# Patient Record
Sex: MALE | Race: WHITE | NOT HISPANIC OR LATINO | Employment: FULL TIME | URBAN - METROPOLITAN AREA
[De-identification: names, ages, dates, MRNs, and addresses within clinical notes are randomized per-mention and may not be internally consistent; named-entity substitution may affect disease eponyms.]

---

## 2023-08-28 ENCOUNTER — TELEPHONE (OUTPATIENT)
Dept: GASTROENTEROLOGY | Facility: CLINIC | Age: 63
End: 2023-08-28
Payer: COMMERCIAL

## 2023-08-28 NOTE — TELEPHONE ENCOUNTER
CALLED THE PT AND TOLD HIM TO CALL HIS PCP AND HAVE THEM TO SEND THE REFERRAL HERE [facility] [place]

## 2023-08-28 NOTE — TELEPHONE ENCOUNTER
THE PT'S WIFE CALLED AND THE PT'S PCP SENT A REFERRAL TO [facility] BUT THEY WANT TO BE SEEN HERE.  WILL YOU CALL THIS PT BACK?  [phone]

## 2023-09-28 ENCOUNTER — TELEPHONE (OUTPATIENT)
Dept: GASTROENTEROLOGY | Facility: CLINIC | Age: 63
End: 2023-09-28
Payer: COMMERCIAL

## 2023-09-28 NOTE — TELEPHONE ENCOUNTER
PT HAS NO PERSONAL OR FAMILY HX OF POLYPS-----NO FAMILY HX OF COLON CA----NO ASA OR BLOOD THINNERS-----LIST OF MEDICATIONS        OLMESARTAN MEDOXOMIL                          OA QUESTIONNAIRE SCANNED IN MEDIA

## 2023-10-02 ENCOUNTER — PREP FOR SURGERY (OUTPATIENT)
Dept: OTHER | Facility: HOSPITAL | Age: 63
End: 2023-10-02
Payer: COMMERCIAL

## 2023-10-02 DIAGNOSIS — Z12.11 ENCOUNTER FOR SCREENING FOR MALIGNANT NEOPLASM OF COLON: Primary | ICD-10-CM

## 2023-11-16 ENCOUNTER — TELEPHONE (OUTPATIENT)
Dept: CARDIOLOGY | Facility: CLINIC | Age: 63
End: 2023-11-16

## 2023-11-16 NOTE — TELEPHONE ENCOUNTER
"  Caller: HOANG GUO    Relationship: Emergency Contact    Best call back number: [phone]    What is the best time to reach you: ANYTIME     Who are you requesting to speak with (clinical staff, provider,  specific staff member): SCHEDULING     What was the call regarding: PT'S WIFE CALLED IN STATING SHE HAD RECEIVED A CALL, AND IT WAS IN REGARDS TO THE PT'S \"NEW PATIENT\" APPT ON 12.4.23, SHE SAYS WE TOLD HER THERE WAS SOME OPEN LUCIANA THIS WEEK AND NEXT TO GET THE PT IN SOONER. PLEASE ADVISE AND TRY PT BACK TO RESCHEDULE.     Is it okay if the provider responds through MyChart: CALL BACK   "

## 2023-11-22 ENCOUNTER — OFFICE VISIT (OUTPATIENT)
Age: 63
End: 2023-11-22
Payer: COMMERCIAL

## 2023-11-22 VITALS
WEIGHT: 307.4 LBS | DIASTOLIC BLOOD PRESSURE: 86 MMHG | HEIGHT: 74 IN | BODY MASS INDEX: 39.45 KG/M2 | HEART RATE: 114 BPM | SYSTOLIC BLOOD PRESSURE: 138 MMHG

## 2023-11-22 DIAGNOSIS — I10 HYPERTENSION, UNSPECIFIED TYPE: ICD-10-CM

## 2023-11-22 DIAGNOSIS — I48.0 PAROXYSMAL ATRIAL FIBRILLATION: Primary | ICD-10-CM

## 2023-11-22 PROCEDURE — 93000 ELECTROCARDIOGRAM COMPLETE: CPT | Performed by: INTERNAL MEDICINE

## 2023-11-22 PROCEDURE — 99204 OFFICE O/P NEW MOD 45 MIN: CPT | Performed by: INTERNAL MEDICINE

## 2023-11-22 RX ORDER — TRIAMCINOLONE ACETONIDE 1 MG/G
CREAM TOPICAL
COMMUNITY
Start: 2023-10-26

## 2023-11-22 RX ORDER — OLMESARTAN MEDOXOMIL 20 MG/1
1 TABLET ORAL DAILY
COMMUNITY
Start: 2023-11-14

## 2023-11-22 RX ORDER — METOPROLOL SUCCINATE 25 MG/1
25 TABLET, EXTENDED RELEASE ORAL DAILY
Qty: 30 TABLET | Refills: 11 | Status: SHIPPED | OUTPATIENT
Start: 2023-11-22

## 2023-11-22 NOTE — PROGRESS NOTES
Nghia SYED Jimenez  1960  Date of Office Visit: 11/22/23  Encounter Provider: Nuno Mruo MD  Place of Service: [facility] CARDIOLOGY      CHIEF COMPLAINT:  Dyspnea on exertion  Irregular heartbeat    HISTORY OF PRESENT ILLNESS:  Very pleasant 63-year-old male with a medical history of obesity, essential hypertension, who presents to me secondary to worsening dyspnea on exertion, lower extremity edema, and palpitations.  He states that his blood pressure cuff has been reading his heart rhythm is irregular.  He chronically has had lower extremity edema which is slightly worse as of late.  He has never been diagnosed with sleep apnea but his wife states that he snores.  He denies any chest pain.  He presents today and is in atrial fibrillation with a rapid ventricular rate of about 120 bpm.  His blood pressure is mildly elevated at 142/86.    Review of Systems   Constitutional: Negative for fever and malaise/fatigue.   HENT:  Negative for nosebleeds and sore throat.    Eyes:  Negative for blurred vision and double vision.   Cardiovascular:  Negative for chest pain, claudication, palpitations and syncope.   Respiratory:  Negative for cough, shortness of breath and snoring.    Endocrine: Negative for cold intolerance, heat intolerance and polydipsia.   Skin:  Negative for itching, poor wound healing and rash.   Musculoskeletal:  Negative for joint pain, joint swelling, muscle weakness and myalgias.   Gastrointestinal:  Negative for abdominal pain, melena, nausea and vomiting.   Neurological:  Negative for light-headedness, loss of balance, seizures, vertigo and weakness.   Psychiatric/Behavioral:  Negative for altered mental status and depression.        Past Medical History:   Diagnosis Date    Hypertension        The following portions of the patient's history were reviewed and updated as appropriate: Social history , Family history, and Surgical history     Current Outpatient  "Medications on File Prior to Visit   Medication Sig Dispense Refill    olmesartan (BENICAR) 20 MG tablet Take 1 tablet by mouth Daily.      STAHIST AD 25-60 MG tablet Take 1 tablet by mouth Every 8 (Eight) Hours.      triamcinolone (KENALOG) 0.1 % cream APPLY TOPICALLY TO THE AFFECTED AREA TWICE DAILY FOR UP TO 2 WEEKS THEN AS NEEDED FOR FLARES      sulfamethoxazole-trimethoprim (BACTRIM DS,SEPTRA DS) 800-160 MG per tablet Take 1 tablet by mouth Every 12 (Twelve) Hours. (Patient not taking: Reported on 11/22/2023)       No current facility-administered medications on file prior to visit.       No Known Allergies    Vitals:    11/22/23 1412 11/22/23 1421   BP: 142/82 138/86   BP Location: Right arm Left arm   Patient Position: Sitting Sitting   Cuff Size: Large Adult Large Adult   Pulse: 114    Weight: (!) 139 kg (307 lb 6.4 oz)    Height: 188 cm (74\")      Body mass index is 39.47 kg/m².   Constitutional:       Appearance: Well-developed.   Eyes:      General: No scleral icterus.     Conjunctiva/sclera: Conjunctivae normal.   HENT:      Head: Normocephalic and atraumatic.   Neck:      Thyroid: No thyromegaly.      Vascular: Normal carotid pulses. No carotid bruit, hepatojugular reflux or JVD.      Trachea: No tracheal deviation.   Pulmonary:      Effort: No respiratory distress.      Breath sounds: Normal breath sounds. No decreased breath sounds. No wheezing. No rhonchi. No rales.   Chest:      Chest wall: Not tender to palpatation.   Cardiovascular:      Tachycardia present. Irregularly irregular rhythm.      No gallop.    Pulses:     Carotid: 2+ bilaterally.     Radial: 2+ bilaterally.     Femoral: 2+ bilaterally.     Dorsalis pedis: 2+ bilaterally.     Posterior tibial: 2+ bilaterally.  Edema:     Peripheral edema absent.   Abdominal:      General: Bowel sounds are normal. There is no distension.      Palpations: Abdomen is soft.      Tenderness: There is no abdominal tenderness.   Musculoskeletal:         " General: No deformity.      Cervical back: Normal range of motion and neck supple. Skin:     Findings: No erythema or rash.   Neurological:      Mental Status: Alert and oriented to person, place, and time.      Sensory: No sensory deficit.   Psychiatric:         Behavior: Behavior normal.             ECG 12 Lead    Date/Time: 11/22/2023 3:13 PM  Performed by: Nuno Muro MD    Authorized by: Nuno Muro MD  Comparison: not compared with previous ECG   Previous ECG: no previous ECG available  Rhythm: atrial fibrillation  Ectopy: unifocal PVCs  Rate: tachycardic  Other findings: non-specific ST-T wave changes               DISCUSSION/SUMMARY  63-year-old male with medical history of obesity, essential hypertension, prediabetes with a hemoglobin A1c of 6.2, who presents to me with worsening dyspnea on exertion over the past few months.  He denies any orthopnea or PND but does have lower extremity edema which he feels like has been a little bit worse.  He is in atrial fibrillation with a rapid ventricular rate which is a new diagnosis for him.    1.  Atrial fibrillation with RVR  - Recommend starting Eliquis therapy in the setting of his essential hypertension and prediabetes along with atrial fibrillation  -Low-dose Toprol started 25 mg p.o. daily  -He will have a transthoracic echocardiogram in about a week or 2.  Depending on his LV function and left atrial size we would consider cardioversion in 3 weeks.  -Sleep evaluation placed.    2.  Obesity: Dietary modification weight loss recommended.  3.  Essential hypertension: Should be reasonably well-controlled on the combination of olmesartan and Toprol therapy.

## 2023-11-28 ENCOUNTER — TELEPHONE (OUTPATIENT)
Age: 63
End: 2023-11-28
Payer: COMMERCIAL

## 2023-11-28 NOTE — TELEPHONE ENCOUNTER
When I called to get him scheduled. His wife wanted me to let you know the last few days he said he has needed to sleep in his recliner because he said that he breathes better that way.    He has a referral for sleep medicine and his echo is coming up on 12/5/23.    I told her I would make you aware.    Asia

## 2023-11-28 NOTE — TELEPHONE ENCOUNTER
----- Message from Nuno Muro MD sent at 11/22/2023  3:16 PM EST -----  Needs to see me in 2 to 3 months.

## 2023-11-29 NOTE — TELEPHONE ENCOUNTER
Pt wife left a VM stating the pt would like to be seen on 1/24 @03786.  I placed the pt on the schedule for that day and time.

## 2023-11-30 NOTE — TELEPHONE ENCOUNTER
I called the pt/wife back. Dr. Muro wanted him seen in 2 weeks due to orthopnea, so I got him on for for 12/20/23.    Is that soon enough or do wee need to add him on at either end of our clinic one day at 9:20a or 4p?    Please advise.    Thanks,  Asia

## 2023-12-05 ENCOUNTER — HOSPITAL ENCOUNTER (OUTPATIENT)
Dept: CARDIOLOGY | Facility: HOSPITAL | Age: 63
Discharge: HOME | End: 2023-12-05
Admitting: INTERNAL MEDICINE
Payer: COMMERCIAL

## 2023-12-05 VITALS
DIASTOLIC BLOOD PRESSURE: 80 MMHG | SYSTOLIC BLOOD PRESSURE: 128 MMHG | BODY MASS INDEX: 39.33 KG/M2 | HEIGHT: 74 IN | WEIGHT: 306.44 LBS | HEART RATE: 85 BPM

## 2023-12-05 DIAGNOSIS — I48.0 PAROXYSMAL ATRIAL FIBRILLATION: ICD-10-CM

## 2023-12-05 LAB
AORTIC ARCH: 3.1 CM
AORTIC DIMENSIONLESS INDEX: 0.5 (DI)
ASCENDING AORTA: 3.8 CM
BH CV ECHO MEAS - ACS: 1.89 CM
BH CV ECHO MEAS - AO MAX PG: 9.4 MMHG
BH CV ECHO MEAS - AO MEAN PG: 5.6 MMHG
BH CV ECHO MEAS - AO ROOT DIAM: 3.1 CM
BH CV ECHO MEAS - AO V2 MAX: 153.1 CM/SEC
BH CV ECHO MEAS - AO V2 VTI: 27.9 CM
BH CV ECHO MEAS - AVA(I,D): 2.14 CM2
BH CV ECHO MEAS - EDV(CUBED): 153.6 ML
BH CV ECHO MEAS - EDV(MOD-SP2): 210 ML
BH CV ECHO MEAS - EDV(MOD-SP4): 216 ML
BH CV ECHO MEAS - EF(MOD-BP): 40.9 %
BH CV ECHO MEAS - EF(MOD-SP2): 40 %
BH CV ECHO MEAS - EF(MOD-SP4): 42.1 %
BH CV ECHO MEAS - ESV(CUBED): 64.9 ML
BH CV ECHO MEAS - ESV(MOD-SP2): 126 ML
BH CV ECHO MEAS - ESV(MOD-SP4): 125 ML
BH CV ECHO MEAS - FS: 24.9 %
BH CV ECHO MEAS - IVS/LVPW: 0.87 CM
BH CV ECHO MEAS - IVSD: 0.99 CM
BH CV ECHO MEAS - LAT PEAK E' VEL: 13.2 CM/SEC
BH CV ECHO MEAS - LV DIASTOLIC VOL/BSA (35-75): 82.9 CM2
BH CV ECHO MEAS - LV MASS(C)D: 220.8 GRAMS
BH CV ECHO MEAS - LV MAX PG: 2.38 MMHG
BH CV ECHO MEAS - LV MEAN PG: 1.45 MMHG
BH CV ECHO MEAS - LV SYSTOLIC VOL/BSA (12-30): 48 CM2
BH CV ECHO MEAS - LV V1 MAX: 77.1 CM/SEC
BH CV ECHO MEAS - LV V1 VTI: 14.8 CM
BH CV ECHO MEAS - LVIDD: 5.4 CM
BH CV ECHO MEAS - LVIDS: 4 CM
BH CV ECHO MEAS - LVOT AREA: 4 CM2
BH CV ECHO MEAS - LVOT DIAM: 2.27 CM
BH CV ECHO MEAS - LVPWD: 1.14 CM
BH CV ECHO MEAS - MED PEAK E' VEL: 8.7 CM/SEC
BH CV ECHO MEAS - MV DEC SLOPE: 501.7 CM/SEC2
BH CV ECHO MEAS - MV DEC TIME: 0.16 SEC
BH CV ECHO MEAS - MV E MAX VEL: 101 CM/SEC
BH CV ECHO MEAS - MV MAX PG: 6.3 MMHG
BH CV ECHO MEAS - MV MEAN PG: 2.7 MMHG
BH CV ECHO MEAS - MV P1/2T: 72.6 MSEC
BH CV ECHO MEAS - MV V2 VTI: 25.6 CM
BH CV ECHO MEAS - MVA(P1/2T): 3 CM2
BH CV ECHO MEAS - MVA(VTI): 2.34 CM2
BH CV ECHO MEAS - PA ACC TIME: 0.1 SEC
BH CV ECHO MEAS - PA V2 MAX: 66.7 CM/SEC
BH CV ECHO MEAS - PI END-D VEL: 137.5 CM/SEC
BH CV ECHO MEAS - QP/QS: 0.9
BH CV ECHO MEAS - RAP SYSTOLE: 8 MMHG
BH CV ECHO MEAS - RV MAX PG: 0.95 MMHG
BH CV ECHO MEAS - RV V1 MAX: 48.8 CM/SEC
BH CV ECHO MEAS - RV V1 VTI: 11.2 CM
BH CV ECHO MEAS - RVOT DIAM: 2.47 CM
BH CV ECHO MEAS - RVSP: 43.8 MMHG
BH CV ECHO MEAS - SI(MOD-SP2): 32.2 ML/M2
BH CV ECHO MEAS - SI(MOD-SP4): 34.9 ML/M2
BH CV ECHO MEAS - SUP REN AO DIAM: 2.6 CM
BH CV ECHO MEAS - SV(LVOT): 59.8 ML
BH CV ECHO MEAS - SV(MOD-SP2): 84 ML
BH CV ECHO MEAS - SV(MOD-SP4): 91 ML
BH CV ECHO MEAS - SV(RVOT): 53.7 ML
BH CV ECHO MEAS - TAPSE (>1.6): 1.91 CM
BH CV ECHO MEAS - TR MAX PG: 35.8 MMHG
BH CV ECHO MEAS - TR MAX VEL: 299.2 CM/SEC
BH CV ECHO MEASUREMENTS AVERAGE E/E' RATIO: 9.22
BH CV XLRA - RV BASE: 4.1 CM
BH CV XLRA - RV LENGTH: 8.1 CM
BH CV XLRA - RV MID: 4.1 CM
BH CV XLRA - TDI S': 13.2 CM/SEC
LEFT ATRIUM VOLUME INDEX: 49.8 ML/M2
SINUS: 3.3 CM
STJ: 3.2 CM

## 2023-12-05 PROCEDURE — 93306 TTE W/DOPPLER COMPLETE: CPT | Performed by: INTERNAL MEDICINE

## 2023-12-05 PROCEDURE — 25510000001 PERFLUTREN (DEFINITY) 8.476 MG IN SODIUM CHLORIDE (PF) 0.9 % 10 ML INJECTION: Performed by: INTERNAL MEDICINE

## 2023-12-05 PROCEDURE — 93306 TTE W/DOPPLER COMPLETE: CPT

## 2023-12-05 RX ADMIN — PERFLUTREN 2 ML: 6.52 INJECTION, SUSPENSION INTRAVENOUS at 08:37

## 2023-12-07 ENCOUNTER — TELEPHONE (OUTPATIENT)
Dept: GASTROENTEROLOGY | Facility: CLINIC | Age: 63
End: 2023-12-07

## 2023-12-07 NOTE — TELEPHONE ENCOUNTER
Caller: Nghia Jimenez    Relationship to patient: Self    Best call back number: [phone]     Chief complaint: PATIENT HAS AN IRREGULAR HEARTBEAT AND IS WANTING TO TAKE CARE OF THIS ISSUE FIRST.     Type of visit: PROCEDURE     Requested date: PATIENT'S WIFE IS THINKING THAT THEY MAY BE ABLE TO RESCHEDULE FOR MARCH 2024.      If rescheduling, when is the original appointment: 12/13/23     Additional notes: PLEASE CALL BACK ASAP TO ADVISE WITH SCHEDULING.

## 2023-12-07 NOTE — TELEPHONE ENCOUNTER
Caller: Nghia Jimenez    Relationship to patient: Self    Best call back number: [phone]     Chief complaint: PATIENT HAS AN IRREGULAR HEARTBEAT AND IS WANTING TO TAKE CARE OF THIS ISSUE FIRST.     Type of visit: PROCEDURE     Requested date: PATIENT'S WIFE IS THINKING THAT THEY MAY BE ABLE TO RESCHEDULE FOR MARCH 2024.      If rescheduling, when is the original appointment: 12/13/23     Additional notes: PLEASE CALL BACK ASAP TO ADVISE WITH SCHEDULING.

## 2023-12-08 ENCOUNTER — TELEPHONE (OUTPATIENT)
Dept: CARDIOLOGY | Facility: CLINIC | Age: 63
End: 2023-12-08
Payer: COMMERCIAL

## 2023-12-08 NOTE — TELEPHONE ENCOUNTER
Pt called wanting the results of his 12/5/23 Echo. It is in [facility].    This is maddy we are bringing back in on 12/20/23 for orthopnea.     He can be reached at P#[phone].        Thanks,  Asia

## 2023-12-12 ENCOUNTER — TELEPHONE (OUTPATIENT)
Age: 63
End: 2023-12-12
Payer: COMMERCIAL

## 2023-12-12 NOTE — TELEPHONE ENCOUNTER
Called and spoke with his wife and gave her the results of his echo.    She is concerned because he has started to develop a cough and his BP is running high on his diastolic. His BP is running around 140/100s.    I moved up his appt to tomorrow with Dr. Muro.    Asia

## 2023-12-12 NOTE — TELEPHONE ENCOUNTER
----- Message from Nuno Muro MD sent at 12/11/2023  3:07 PM EST -----  Please let him know that the atrial fibrillation has made his heart muscle little bit weak.  We will plan on seeing him back at his follow-up on 12/20 and if he is still in atrial fibrillation we will get him set up for direct-current cardioversion

## 2023-12-13 ENCOUNTER — OUTSIDE FACILITY SERVICE (OUTPATIENT)
Dept: GASTROENTEROLOGY | Facility: CLINIC | Age: 63
End: 2023-12-13
Payer: COMMERCIAL

## 2023-12-13 ENCOUNTER — OFFICE VISIT (OUTPATIENT)
Age: 63
End: 2023-12-13
Payer: COMMERCIAL

## 2023-12-13 ENCOUNTER — LAB (OUTPATIENT)
Dept: LAB | Facility: HOSPITAL | Age: 63
End: 2023-12-13
Payer: COMMERCIAL

## 2023-12-13 VITALS
OXYGEN SATURATION: 97 % | HEIGHT: 74 IN | DIASTOLIC BLOOD PRESSURE: 100 MMHG | BODY MASS INDEX: 39.53 KG/M2 | SYSTOLIC BLOOD PRESSURE: 134 MMHG | HEART RATE: 107 BPM | WEIGHT: 308 LBS

## 2023-12-13 DIAGNOSIS — I10 HYPERTENSION, UNSPECIFIED TYPE: ICD-10-CM

## 2023-12-13 DIAGNOSIS — I48.0 PAROXYSMAL ATRIAL FIBRILLATION: ICD-10-CM

## 2023-12-13 DIAGNOSIS — I48.0 PAROXYSMAL ATRIAL FIBRILLATION: Primary | ICD-10-CM

## 2023-12-13 LAB
ALBUMIN SERPL-MCNC: 4.4 G/DL (ref 3.5–5.2)
ALBUMIN/GLOB SERPL: 1.9 G/DL
ALP SERPL-CCNC: 82 U/L (ref 39–117)
ALT SERPL W P-5'-P-CCNC: 24 U/L (ref 1–41)
ANION GAP SERPL CALCULATED.3IONS-SCNC: 9 MMOL/L (ref 5–15)
AST SERPL-CCNC: 22 U/L (ref 1–40)
BASOPHILS # BLD AUTO: 0.04 10*3/MM3 (ref 0–0.2)
BASOPHILS NFR BLD AUTO: 0.7 % (ref 0–1.5)
BILIRUB SERPL-MCNC: 0.9 MG/DL (ref 0–1.2)
BUN SERPL-MCNC: 15 MG/DL (ref 8–23)
BUN/CREAT SERPL: 12.9 (ref 7–25)
CALCIUM SPEC-SCNC: 9.3 MG/DL (ref 8.6–10.5)
CHLORIDE SERPL-SCNC: 105 MMOL/L (ref 98–107)
CO2 SERPL-SCNC: 28 MMOL/L (ref 22–29)
CREAT SERPL-MCNC: 1.16 MG/DL (ref 0.76–1.27)
DEPRECATED RDW RBC AUTO: 44.9 FL (ref 37–54)
EGFRCR SERPLBLD CKD-EPI 2021: 71.2 ML/MIN/1.73
EOSINOPHIL # BLD AUTO: 0.09 10*3/MM3 (ref 0–0.4)
EOSINOPHIL NFR BLD AUTO: 1.7 % (ref 0.3–6.2)
ERYTHROCYTE [DISTWIDTH] IN BLOOD BY AUTOMATED COUNT: 13.1 % (ref 12.3–15.4)
GLOBULIN UR ELPH-MCNC: 2.3 GM/DL
GLUCOSE SERPL-MCNC: 123 MG/DL (ref 65–99)
HCT VFR BLD AUTO: 47.8 % (ref 37.5–51)
HGB BLD-MCNC: 16 G/DL (ref 13–17.7)
IMM GRANULOCYTES # BLD AUTO: 0.01 10*3/MM3 (ref 0–0.05)
IMM GRANULOCYTES NFR BLD AUTO: 0.2 % (ref 0–0.5)
LYMPHOCYTES # BLD AUTO: 0.91 10*3/MM3 (ref 0.7–3.1)
LYMPHOCYTES NFR BLD AUTO: 16.9 % (ref 19.6–45.3)
MCH RBC QN AUTO: 31.1 PG (ref 26.6–33)
MCHC RBC AUTO-ENTMCNC: 33.5 G/DL (ref 31.5–35.7)
MCV RBC AUTO: 92.8 FL (ref 79–97)
MONOCYTES # BLD AUTO: 0.51 10*3/MM3 (ref 0.1–0.9)
MONOCYTES NFR BLD AUTO: 9.5 % (ref 5–12)
NEUTROPHILS NFR BLD AUTO: 3.83 10*3/MM3 (ref 1.7–7)
NEUTROPHILS NFR BLD AUTO: 71 % (ref 42.7–76)
NRBC BLD AUTO-RTO: 0 /100 WBC (ref 0–0.2)
NT-PROBNP SERPL-MCNC: 2335 PG/ML (ref 0–900)
PLATELET # BLD AUTO: 179 10*3/MM3 (ref 140–450)
PMV BLD AUTO: 9.6 FL (ref 6–12)
POTASSIUM SERPL-SCNC: 4.6 MMOL/L (ref 3.5–5.2)
PROT SERPL-MCNC: 6.7 G/DL (ref 6–8.5)
RBC # BLD AUTO: 5.15 10*6/MM3 (ref 4.14–5.8)
SODIUM SERPL-SCNC: 142 MMOL/L (ref 136–145)
WBC NRBC COR # BLD AUTO: 5.39 10*3/MM3 (ref 3.4–10.8)

## 2023-12-13 PROCEDURE — 80053 COMPREHEN METABOLIC PANEL: CPT

## 2023-12-13 PROCEDURE — 85025 COMPLETE CBC W/AUTO DIFF WBC: CPT

## 2023-12-13 PROCEDURE — 36415 COLL VENOUS BLD VENIPUNCTURE: CPT

## 2023-12-13 PROCEDURE — 93000 ELECTROCARDIOGRAM COMPLETE: CPT | Performed by: INTERNAL MEDICINE

## 2023-12-13 PROCEDURE — 83880 ASSAY OF NATRIURETIC PEPTIDE: CPT

## 2023-12-13 PROCEDURE — 99214 OFFICE O/P EST MOD 30 MIN: CPT | Performed by: INTERNAL MEDICINE

## 2023-12-13 RX ORDER — FUROSEMIDE 40 MG/1
40 TABLET ORAL DAILY
Qty: 30 TABLET | Refills: 11 | Status: SHIPPED | OUTPATIENT
Start: 2023-12-13

## 2023-12-13 RX ORDER — DIPHENHYDRAMINE HCL 25 MG
25 CAPSULE ORAL EVERY 6 HOURS PRN
COMMUNITY

## 2023-12-13 NOTE — PROGRESS NOTES
Nghia SYED Jimenez  1960  Date of Office Visit: 12/13/23  Encounter Provider: Nuno Muro MD  Place of Service: [facility] CARDIOLOGY      CHIEF COMPLAINT:  Dyspnea on exertion  Irregular heartbeat    HISTORY OF PRESENT ILLNESS:  63-year-old male with a medical history of obesity, essential hypertension, who presents to me secondary to worsening dyspnea on exertion, lower extremity edema, and palpitations.  I saw him a few weeks ago and he was in atrial fibrillation with a rapid ventricular rate.  We started him on Toprol therapy with some improvement in his ventricular rate on his home measurements.  He was also started on Eliquis therapy and has been compliant with that.  He states that over the past few weeks he has noticed worsening shortness of breath along with orthopnea and lower extremity edema.  He denies any chest pain but has had at least moderate in intensity dyspnea on exertion.    Review of Systems   Constitutional: Negative for fever and malaise/fatigue.   HENT:  Negative for nosebleeds and sore throat.    Eyes:  Negative for blurred vision and double vision.   Cardiovascular:  Negative for chest pain, claudication, palpitations and syncope.   Respiratory:  Negative for cough, shortness of breath and snoring.    Endocrine: Negative for cold intolerance, heat intolerance and polydipsia.   Skin:  Negative for itching, poor wound healing and rash.   Musculoskeletal:  Negative for joint pain, joint swelling, muscle weakness and myalgias.   Gastrointestinal:  Negative for abdominal pain, melena, nausea and vomiting.   Neurological:  Negative for light-headedness, loss of balance, seizures, vertigo and weakness.   Psychiatric/Behavioral:  Negative for altered mental status and depression.        Past Medical History:   Diagnosis Date    Hypertension        The following portions of the patient's history were reviewed and updated as appropriate: Social history ,  "Family history, and Surgical history     Current Outpatient Medications on File Prior to Visit   Medication Sig Dispense Refill    apixaban (ELIQUIS) 5 MG tablet tablet Take 1 tablet by mouth 2 (Two) Times a Day. 60 tablet 6    diphenhydrAMINE (BENADRYL) 25 mg capsule Take 1 capsule by mouth Every 6 (Six) Hours As Needed for Itching.      metoprolol succinate XL (TOPROL-XL) 25 MG 24 hr tablet Take 1 tablet by mouth Daily. 30 tablet 11    olmesartan (BENICAR) 20 MG tablet Take 1 tablet by mouth Daily.      STAHIST AD 25-60 MG tablet Take 1 tablet by mouth Every 8 (Eight) Hours.      triamcinolone (KENALOG) 0.1 % cream APPLY TOPICALLY TO THE AFFECTED AREA TWICE DAILY FOR UP TO 2 WEEKS THEN AS NEEDED FOR FLARES       No current facility-administered medications on file prior to visit.       No Known Allergies    Vitals:    12/13/23 1148   BP: 134/100   BP Location: Left arm   Patient Position: Sitting   Pulse: 107   SpO2: 97%   Weight: (!) 140 kg (308 lb)   Height: 188 cm (74.02\")       Body mass index is 39.52 kg/m².   Constitutional:       Appearance: Well-developed.   Eyes:      General: No scleral icterus.     Conjunctiva/sclera: Conjunctivae normal.   HENT:      Head: Normocephalic and atraumatic.   Neck:      Thyroid: No thyromegaly.      Vascular: Normal carotid pulses. No carotid bruit, hepatojugular reflux or JVD.      Trachea: No tracheal deviation.   Pulmonary:      Effort: No respiratory distress.      Breath sounds: Normal breath sounds. No decreased breath sounds. No wheezing. No rhonchi. No rales.   Chest:      Chest wall: Not tender to palpatation.   Cardiovascular:      Tachycardia present. Irregularly irregular rhythm.      No gallop.    Pulses:     Carotid: 2+ bilaterally.     Radial: 2+ bilaterally.     Femoral: 2+ bilaterally.     Dorsalis pedis: 2+ bilaterally.     Posterior tibial: 2+ bilaterally.  Edema:     Pretibial: bilateral 2+ edema of the pretibial area.     Ankle: bilateral 2+ edema of " the ankle.     Feet: bilateral 2+ edema of the feet.  Abdominal:      General: Bowel sounds are normal. There is no distension.      Palpations: Abdomen is soft.      Tenderness: There is no abdominal tenderness.   Musculoskeletal:         General: No deformity.      Cervical back: Normal range of motion and neck supple. Skin:     Findings: No erythema or rash.   Neurological:      Mental Status: Alert and oriented to person, place, and time.      Sensory: No sensory deficit.   Psychiatric:         Behavior: Behavior normal.               ECG 12 Lead    Date/Time: 12/13/2023 12:19 PM  Performed by: Nuno Muro MD    Authorized by: Nuno Muro MD  Comparison: compared with previous ECG from 11/22/2023  Similar to previous ECG  Rhythm: atrial fibrillation  Ectopy: unifocal PVCs  Rate: tachycardic           Results for orders placed during the hospital encounter of 12/05/23    Adult Transthoracic Echo Complete w/ Color, Spectral and Contrast if Necessary Per Protocol    Interpretation Summary    Left ventricular systolic function is mildly decreased. Left ventricular ejection fraction appears to be 41 - 45%.    Mildly reduced right ventricular systolic function noted.    The left atrial cavity is severely dilated.    Estimated right ventricular systolic pressure from tricuspid regurgitation is mildly elevated (35-45 mmHg).      DISCUSSION/SUMMARY  63-year-old male with medical history of obesity, essential hypertension, prediabetes with a hemoglobin A1c of 6.2, who initially presented to me secondary to palpitations and dyspnea and was noted to be in atrial fibrillation with rapid ventricular rate.  Transthoracic echocardiogram has also showed a mildly decreased ejection fraction along with severe left atrial enlargement.  His ventricular rate has been better controlled at home but he now has complaints of worsening dyspnea and orthopnea and lower extremity edema.    1.  Atrial fibrillation with  RVR  - Recommend continuing Eliquis 5 mg p.o. every 12 hours.  No recent bleeding complications  - No change in Toprol therapy.  - Plan on direct-current cardioversion.  Order has been placed      2.  Obesity: Dietary modification weight loss recommended.    3.  Essential hypertension: Continue current dose of olmesartan and Toprol therapy.  No changes indicated.    4.  Acute heart failure with reduced ejection fraction: Recommend starting furosemide therapy.  CMP, proBNP, and CBC ordered today.  I will assess his potassium level and whether we need to start potassium supplementation as well after lab work has been returned.

## 2023-12-14 ENCOUNTER — TELEPHONE (OUTPATIENT)
Dept: CARDIOLOGY | Facility: CLINIC | Age: 63
End: 2023-12-14

## 2023-12-14 NOTE — TELEPHONE ENCOUNTER
Caller: HOANG GUO    Relationship: Emergency Contact    Best call back number: [phone]    What is the best time to reach you: ANY    Who are you requesting to speak with (clinical staff, provider,  specific staff member): CLINICAL    Do you know the name of the person who called: NANO    What was the call regarding: PATIENT'S WIFE HOANG STATED THAT NANO HAD CALLED ABOUT SCHEDULING A PROCEDURE FOR THE PATIENT. PLEASE CONTACT HOANG TO DISCUSS SCHEDULING PROCEDURE. THANK YOU.     Is it okay if the provider responds through [employer]: PLEASE CALL.

## 2023-12-19 ENCOUNTER — ANESTHESIA (OUTPATIENT)
Dept: POSTOP/PACU | Facility: HOSPITAL | Age: 63
End: 2023-12-19
Payer: COMMERCIAL

## 2023-12-19 ENCOUNTER — HOSPITAL ENCOUNTER (OUTPATIENT)
Dept: POSTOP/PACU | Facility: HOSPITAL | Age: 63
Discharge: HOME | End: 2023-12-19
Payer: COMMERCIAL

## 2023-12-19 ENCOUNTER — ANESTHESIA EVENT (OUTPATIENT)
Dept: POSTOP/PACU | Facility: HOSPITAL | Age: 63
End: 2023-12-19
Payer: COMMERCIAL

## 2023-12-19 VITALS
HEART RATE: 81 BPM | DIASTOLIC BLOOD PRESSURE: 90 MMHG | SYSTOLIC BLOOD PRESSURE: 137 MMHG | RESPIRATION RATE: 16 BRPM | OXYGEN SATURATION: 100 %

## 2023-12-19 DIAGNOSIS — I10 HYPERTENSION, UNSPECIFIED TYPE: ICD-10-CM

## 2023-12-19 DIAGNOSIS — I48.0 PAROXYSMAL ATRIAL FIBRILLATION: ICD-10-CM

## 2023-12-19 LAB
QT INTERVAL: 369 MS
QT INTERVAL: 377 MS
QTC INTERVAL: 438 MS
QTC INTERVAL: 464 MS

## 2023-12-19 PROCEDURE — 25810000003 LACTATED RINGERS PER 1000 ML: Performed by: NURSE ANESTHETIST, CERTIFIED REGISTERED

## 2023-12-19 PROCEDURE — 25010000002 PROPOFOL 10 MG/ML EMULSION: Performed by: NURSE ANESTHETIST, CERTIFIED REGISTERED

## 2023-12-19 PROCEDURE — 92960 CARDIOVERSION ELECTRIC EXT: CPT

## 2023-12-19 PROCEDURE — 93005 ELECTROCARDIOGRAM TRACING: CPT | Performed by: INTERNAL MEDICINE

## 2023-12-19 PROCEDURE — 25010000002 LIDOCAINE 1 % SOLUTION: Performed by: NURSE ANESTHETIST, CERTIFIED REGISTERED

## 2023-12-19 RX ORDER — LIDOCAINE HYDROCHLORIDE 10 MG/ML
INJECTION, SOLUTION INFILTRATION; PERINEURAL AS NEEDED
Status: DISCONTINUED | OUTPATIENT
Start: 2023-12-19 | End: 2023-12-19 | Stop reason: SURG

## 2023-12-19 RX ORDER — ONDANSETRON 2 MG/ML
4 INJECTION INTRAMUSCULAR; INTRAVENOUS ONCE AS NEEDED
Status: DISCONTINUED | OUTPATIENT
Start: 2023-12-19 | End: 2023-12-20 | Stop reason: HOSPADM

## 2023-12-19 RX ORDER — SODIUM CHLORIDE, SODIUM LACTATE, POTASSIUM CHLORIDE, CALCIUM CHLORIDE 600; 310; 30; 20 MG/100ML; MG/100ML; MG/100ML; MG/100ML
INJECTION, SOLUTION INTRAVENOUS CONTINUOUS PRN
Status: DISCONTINUED | OUTPATIENT
Start: 2023-12-19 | End: 2023-12-19 | Stop reason: SURG

## 2023-12-19 RX ORDER — PROMETHAZINE HYDROCHLORIDE 25 MG/1
25 SUPPOSITORY RECTAL ONCE AS NEEDED
Status: DISCONTINUED | OUTPATIENT
Start: 2023-12-19 | End: 2023-12-20 | Stop reason: HOSPADM

## 2023-12-19 RX ORDER — PROMETHAZINE HYDROCHLORIDE 25 MG/1
25 TABLET ORAL ONCE AS NEEDED
Status: DISCONTINUED | OUTPATIENT
Start: 2023-12-19 | End: 2023-12-20 | Stop reason: HOSPADM

## 2023-12-19 RX ORDER — DIPHENHYDRAMINE HYDROCHLORIDE 50 MG/ML
12.5 INJECTION INTRAMUSCULAR; INTRAVENOUS
Status: DISCONTINUED | OUTPATIENT
Start: 2023-12-19 | End: 2023-12-20 | Stop reason: HOSPADM

## 2023-12-19 RX ORDER — DROPERIDOL 2.5 MG/ML
0.62 INJECTION, SOLUTION INTRAMUSCULAR; INTRAVENOUS
Status: DISCONTINUED | OUTPATIENT
Start: 2023-12-19 | End: 2023-12-20 | Stop reason: HOSPADM

## 2023-12-19 RX ORDER — NALOXONE HCL 0.4 MG/ML
0.2 VIAL (ML) INJECTION AS NEEDED
Status: DISCONTINUED | OUTPATIENT
Start: 2023-12-19 | End: 2023-12-20 | Stop reason: HOSPADM

## 2023-12-19 RX ORDER — PROPOFOL 10 MG/ML
VIAL (ML) INTRAVENOUS AS NEEDED
Status: DISCONTINUED | OUTPATIENT
Start: 2023-12-19 | End: 2023-12-19 | Stop reason: SURG

## 2023-12-19 RX ORDER — FLUMAZENIL 0.1 MG/ML
0.2 INJECTION INTRAVENOUS AS NEEDED
Status: DISCONTINUED | OUTPATIENT
Start: 2023-12-19 | End: 2023-12-20 | Stop reason: HOSPADM

## 2023-12-19 RX ADMIN — SODIUM CHLORIDE, POTASSIUM CHLORIDE, SODIUM LACTATE AND CALCIUM CHLORIDE: 600; 310; 30; 20 INJECTION, SOLUTION INTRAVENOUS at 07:13

## 2023-12-19 RX ADMIN — LIDOCAINE HYDROCHLORIDE 60 ML: 10 INJECTION, SOLUTION INFILTRATION; PERINEURAL at 07:16

## 2023-12-19 RX ADMIN — PROPOFOL 100 MG: 10 INJECTION, EMULSION INTRAVENOUS at 07:16

## 2023-12-19 NOTE — H&P
H&P reviewed agree with details.  Patient in atrial fibrillation who has been on anticoagulation without interruption for the last 3 to 4 weeks.  Electrolytes are within normal range.  He is in atrial fibrillation on the screen.  Risks and benefits were explained.  Informed consent was obtained.  Follow cardioversion results for further treatment recommendations.    Nghia Jimenez  1960  Date of Office Visit: 12/19/23  Encounter Provider: Boy Cole Jr, MD  Place of Service: [facility] CARDIOLOGY      CHIEF COMPLAINT:  Dyspnea on exertion  Irregular heartbeat    HISTORY OF PRESENT ILLNESS:  63-year-old male with a medical history of obesity, essential hypertension, who presents to me secondary to worsening dyspnea on exertion, lower extremity edema, and palpitations.  I saw him a few weeks ago and he was in atrial fibrillation with a rapid ventricular rate.  We started him on Toprol therapy with some improvement in his ventricular rate on his home measurements.  He was also started on Eliquis therapy and has been compliant with that.  He states that over the past few weeks he has noticed worsening shortness of breath along with orthopnea and lower extremity edema.  He denies any chest pain but has had at least moderate in intensity dyspnea on exertion.    Review of Systems   Constitutional: Negative for fever and malaise/fatigue.   HENT:  Negative for nosebleeds and sore throat.    Eyes:  Negative for blurred vision and double vision.   Cardiovascular:  Negative for chest pain, claudication, palpitations and syncope.   Respiratory:  Negative for cough, shortness of breath and snoring.    Endocrine: Negative for cold intolerance, heat intolerance and polydipsia.   Skin:  Negative for itching, poor wound healing and rash.   Musculoskeletal:  Negative for joint pain, joint swelling, muscle weakness and myalgias.   Gastrointestinal:  Negative for abdominal pain, melena, nausea and  vomiting.   Neurological:  Negative for light-headedness, loss of balance, seizures, vertigo and weakness.   Psychiatric/Behavioral:  Negative for altered mental status and depression.        Past Medical History:   Diagnosis Date    Hypertension        The following portions of the patient's history were reviewed and updated as appropriate: Social history , Family history, and Surgical history     Current Outpatient Medications on File Prior to Encounter   Medication Sig Dispense Refill    apixaban (ELIQUIS) 5 MG tablet tablet Take 1 tablet by mouth 2 (Two) Times a Day. 60 tablet 6    furosemide (LASIX) 40 MG tablet Take 1 tablet by mouth Daily. 30 tablet 11    metoprolol succinate XL (TOPROL-XL) 25 MG 24 hr tablet Take 1 tablet by mouth Daily. 30 tablet 11    olmesartan (BENICAR) 20 MG tablet Take 1 tablet by mouth Daily.      diphenhydrAMINE (BENADRYL) 25 mg capsule Take 1 capsule by mouth Every 6 (Six) Hours As Needed for Itching.      triamcinolone (KENALOG) 0.1 % cream APPLY TOPICALLY TO THE AFFECTED AREA TWICE DAILY FOR UP TO 2 WEEKS THEN AS NEEDED FOR FLARES       No current facility-administered medications on file prior to encounter.       No Known Allergies    Vitals:    12/19/23 0537   Pulse: 97       There is no height or weight on file to calculate BMI.   Constitutional:       Appearance: Well-developed.   Eyes:      General: No scleral icterus.     Conjunctiva/sclera: Conjunctivae normal.   HENT:      Head: Normocephalic and atraumatic.   Neck:      Thyroid: No thyromegaly.      Vascular: Normal carotid pulses. No carotid bruit, hepatojugular reflux or JVD.      Trachea: No tracheal deviation.   Pulmonary:      Effort: No respiratory distress.      Breath sounds: Normal breath sounds. No decreased breath sounds. No wheezing. No rhonchi. No rales.   Chest:      Chest wall: Not tender to palpatation.   Cardiovascular:      Tachycardia present. Irregularly irregular rhythm.      No gallop.    Pulses:      Carotid: 2+ bilaterally.     Radial: 2+ bilaterally.     Femoral: 2+ bilaterally.     Dorsalis pedis: 2+ bilaterally.     Posterior tibial: 2+ bilaterally.  Edema:     Pretibial: bilateral 2+ edema of the pretibial area.     Ankle: bilateral 2+ edema of the ankle.     Feet: bilateral 2+ edema of the feet.  Abdominal:      General: Bowel sounds are normal. There is no distension.      Palpations: Abdomen is soft.      Tenderness: There is no abdominal tenderness.   Musculoskeletal:         General: No deformity.      Cervical back: Normal range of motion and neck supple. Skin:     Findings: No erythema or rash.   Neurological:      Mental Status: Alert and oriented to person, place, and time.      Sensory: No sensory deficit.   Psychiatric:         Behavior: Behavior normal.             Procedures     Results for orders placed during the hospital encounter of 12/05/23    Adult Transthoracic Echo Complete w/ Color, Spectral and Contrast if Necessary Per Protocol    Interpretation Summary    Left ventricular systolic function is mildly decreased. Left ventricular ejection fraction appears to be 41 - 45%.    Mildly reduced right ventricular systolic function noted.    The left atrial cavity is severely dilated.    Estimated right ventricular systolic pressure from tricuspid regurgitation is mildly elevated (35-45 mmHg).      DISCUSSION/SUMMARY  63-year-old male with medical history of obesity, essential hypertension, prediabetes with a hemoglobin A1c of 6.2, who initially presented to me secondary to palpitations and dyspnea and was noted to be in atrial fibrillation with rapid ventricular rate.  Transthoracic echocardiogram has also showed a mildly decreased ejection fraction along with severe left atrial enlargement.  His ventricular rate has been better controlled at home but he now has complaints of worsening dyspnea and orthopnea and lower extremity edema.    1.  Atrial fibrillation with RVR  - Recommend continuing  Eliquis 5 mg p.o. every 12 hours.  No recent bleeding complications  - No change in Toprol therapy.  - Plan on direct-current cardioversion.  Order has been placed      2.  Obesity: Dietary modification weight loss recommended.    3.  Essential hypertension: Continue current dose of olmesartan and Toprol therapy.  No changes indicated.    4.  Acute heart failure with reduced ejection fraction: Recommend starting furosemide therapy.  CMP, proBNP, and CBC ordered today.  I will assess his potassium level and whether we need to start potassium supplementation as well after lab work has been returned.

## 2023-12-19 NOTE — ANESTHESIA POSTPROCEDURE EVALUATION
Patient: Nghia Jimenez    Procedure Summary       Date: 12/19/23 Room / Location: [facility] PACU    Anesthesia Start: 0718 Anesthesia Stop: 0720    Procedure: CARDIOVERSION EXTERNAL IN CARDIOLOGY DEPARTMENT Diagnosis:       Paroxysmal atrial fibrillation      Hypertension, unspecified type      (atrial fibrillation)    Scheduled Providers: Boy Cole Jr., MD Provider: Woody Puentes MD    Anesthesia Type: MAC ASA Status: 2            Anesthesia Type: MAC    Vitals  Vitals Value Taken Time   /66 12/19/23 0745   Temp     Pulse 81 12/19/23 0749   Resp 20 12/19/23 0740   SpO2 97 % 12/19/23 0749   Vitals shown include unfiled device data.        Post Anesthesia Care and Evaluation      Comments: Pt. Discharged prior to being evaluated by anesthesia.  Chart is reviewed and no complications are noted.  THIS CASE IS NOT MEDICALLY DIRECTED

## 2023-12-19 NOTE — ANESTHESIA PREPROCEDURE EVALUATION
Anesthesia Evaluation     Patient summary reviewed and Nursing notes reviewed   NPO Solid Status: > 8 hours  NPO Liquid Status: > 2 hours           Airway   Mallampati: II  TM distance: >3 FB  Neck ROM: full  no difficulty expected  Dental - normal exam     Pulmonary - negative pulmonary ROS   (-) decreased breath sounds, wheezes  Cardiovascular - normal exam  Exercise tolerance: good (4-7 METS)    (+) hypertension, dysrhythmias Atrial Fib      Neuro/Psych- negative ROS  (-) seizures, CVA  GI/Hepatic/Renal/Endo    (+) obesity  (-) diabetes    Musculoskeletal (-) negative ROS    Abdominal    Substance History - negative use  (-) alcohol use, drug use     OB/GYN negative ob/gyn ROS         Other - negative ROS                   Anesthesia Plan    ASA 2     MAC     intravenous induction     Anesthetic plan, risks, benefits, and alternatives have been provided, discussed and informed consent has been obtained with: patient.    CODE STATUS:

## 2023-12-20 ENCOUNTER — TELEPHONE (OUTPATIENT)
Age: 63
End: 2023-12-20
Payer: COMMERCIAL

## 2023-12-20 NOTE — TELEPHONE ENCOUNTER
Pt's wife called.  He was Cardioverted yesterday by ANDREW. He went home in sinus rhythm. But last night when he checked his BP the machine said he was out of rhythm. Later last night he went back into rhythm, but this morning his BP monitor says he's back out of rhythm again. His BP is running 130s/80s.    They want to know if he needs to do anything different with his medication and when they should follow back up in the office.    Please advise.    Thanks,  Asia

## 2023-12-22 ENCOUNTER — CLINICAL SUPPORT (OUTPATIENT)
Dept: CARDIOLOGY | Facility: CLINIC | Age: 63
End: 2023-12-22
Payer: COMMERCIAL

## 2023-12-22 VITALS — SYSTOLIC BLOOD PRESSURE: 132 MMHG | HEART RATE: 107 BPM | DIASTOLIC BLOOD PRESSURE: 70 MMHG

## 2023-12-22 DIAGNOSIS — I49.3 FREQUENT PVCS: Primary | ICD-10-CM

## 2023-12-22 PROCEDURE — 93000 ELECTROCARDIOGRAM COMPLETE: CPT | Performed by: INTERNAL MEDICINE

## 2023-12-22 NOTE — PROGRESS NOTES
Procedure     ECG 12 Lead    Date/Time: 12/22/2023 4:30 PM  Performed by: Nuno Muro MD    Authorized by: Nuno Muro MD  Comparison: compared with previous ECG from 12/19/2023  Comparison to previous ECG: Sinus is new    Rhythm: sinus rhythm  Ectopy: trigeminy  Rate: tachycardic

## 2023-12-22 NOTE — PROGRESS NOTES
Patient came in today for an EKG Check because he though he might be back in Afib after his Cardioversion on 12/19/23.  He denies any symptoms and overall feels better since the Cardioversion. He is also down 13lbs since starting the Furosemide 40mg daily on 12/13/23.  His medications were reconciled.    His EKG was reviewed with Dr. Muro. He is in Sinus Rhythm with Frequent PVCs. Per Dr. Muro he would like a 48 hour monitor placed on the patient today. This was relayed to the patient and he agreed with the plan, and a monitor was placed.

## 2023-12-22 NOTE — TELEPHONE ENCOUNTER
Called pt. He prefers to come in today for his EKG check. So I told him he will need to be here by 4:30p. And I will do it.    Asia

## 2024-01-23 ENCOUNTER — TELEPHONE (OUTPATIENT)
Dept: CARDIOLOGY | Facility: CLINIC | Age: 64
End: 2024-01-23
Payer: COMMERCIAL

## 2024-01-23 NOTE — TELEPHONE ENCOUNTER
Caller: HOANG GUO    Relationship to patient: Emergency Contact    Best call back number: [phone]    Chief complaint: RESCHEDULE    Type of visit: FOLLOW UP    Requested date: ASAP     If rescheduling, when is the original appointment: 1/24/24     Additional notes: PT'S WIFE IS CALLING TO SEE WHY APPT WAS CANCELLED ON 1/24/24 AND TO RESCHEDULE

## 2024-01-23 NOTE — TELEPHONE ENCOUNTER
Not really sure who or why this was canceled. Can you please look into this? I know he opened his schedule for tomorrow.

## 2024-01-24 ENCOUNTER — OFFICE VISIT (OUTPATIENT)
Age: 64
End: 2024-01-24
Payer: COMMERCIAL

## 2024-01-24 VITALS
HEART RATE: 75 BPM | HEIGHT: 74 IN | WEIGHT: 295 LBS | SYSTOLIC BLOOD PRESSURE: 128 MMHG | BODY MASS INDEX: 37.86 KG/M2 | DIASTOLIC BLOOD PRESSURE: 82 MMHG

## 2024-01-24 DIAGNOSIS — I48.0 PAROXYSMAL ATRIAL FIBRILLATION: ICD-10-CM

## 2024-01-24 DIAGNOSIS — I10 HYPERTENSION, UNSPECIFIED TYPE: ICD-10-CM

## 2024-01-24 DIAGNOSIS — I49.3 FREQUENT PVCS: Primary | ICD-10-CM

## 2024-01-24 DIAGNOSIS — I50.21 ACUTE SYSTOLIC CHF (CONGESTIVE HEART FAILURE): ICD-10-CM

## 2024-01-24 RX ORDER — METOPROLOL SUCCINATE 50 MG/1
50 TABLET, EXTENDED RELEASE ORAL DAILY
Qty: 90 TABLET | Refills: 1 | Status: SHIPPED | OUTPATIENT
Start: 2024-01-24

## 2024-01-24 RX ORDER — AMOXICILLIN 875 MG/1
1 TABLET, COATED ORAL EVERY 12 HOURS SCHEDULED
COMMUNITY
Start: 2024-01-22

## 2024-01-24 NOTE — PROGRESS NOTES
Nghia Jimenez  1960  Date of Office Visit: 01/24/24  Encounter Provider: Nuno Muro MD  Place of Service: [facility] CARDIOLOGY      CHIEF COMPLAINT:  Dyspnea on exertion  Irregular heartbeat    HISTORY OF PRESENT ILLNESS:  63-year-old male with a medical history of obesity, essential hypertension, who presents to me secondary to worsening dyspnea on exertion, lower extremity edema, and palpitations.  He was found to be in atrial fibrillation and started on Toprol therapy along with Eliquis.  He subsequently was scheduled for direct-current cardioversion and echocardiogram.  Prior to the cardioversion his echocardiogram showed a mildly depressed left ventricular systolic function.  He was felt to be volume overloaded and started on Lasix therapy with significant improvement in his dyspnea.  His cardioversion was successful and he is maintaining sinus rhythm.  He states that his breathing has improved and he denies palpitations or tachycardia at this time.  He did wear a monitor with no recurrence of atrial fibrillation but a resting sinus rate in the 90s with PVCs and PACs.    Review of Systems   Constitutional: Negative for fever and malaise/fatigue.   HENT:  Negative for nosebleeds and sore throat.    Eyes:  Negative for blurred vision and double vision.   Cardiovascular:  Negative for chest pain, claudication, palpitations and syncope.   Respiratory:  Negative for cough, shortness of breath and snoring.    Endocrine: Negative for cold intolerance, heat intolerance and polydipsia.   Skin:  Negative for itching, poor wound healing and rash.   Musculoskeletal:  Negative for joint pain, joint swelling, muscle weakness and myalgias.   Gastrointestinal:  Negative for abdominal pain, melena, nausea and vomiting.   Neurological:  Negative for light-headedness, loss of balance, seizures, vertigo and weakness.   Psychiatric/Behavioral:  Negative for altered mental status and  "depression.        Past Medical History:   Diagnosis Date    Hypertension        The following portions of the patient's history were reviewed and updated as appropriate: Social history , Family history, and Surgical history     Current Outpatient Medications on File Prior to Visit   Medication Sig Dispense Refill    amoxicillin (AMOXIL) 875 MG tablet Take 1 tablet by mouth Every 12 (Twelve) Hours.      apixaban (ELIQUIS) 5 MG tablet tablet Take 1 tablet by mouth 2 (Two) Times a Day. 60 tablet 6    diphenhydrAMINE (BENADRYL) 25 mg capsule Take 1 capsule by mouth Every 6 (Six) Hours As Needed for Itching.      furosemide (LASIX) 40 MG tablet Take 1 tablet by mouth Daily. 30 tablet 11    metoprolol succinate XL (TOPROL-XL) 25 MG 24 hr tablet Take 1 tablet by mouth Daily. 30 tablet 11    olmesartan (BENICAR) 20 MG tablet Take 1 tablet by mouth Daily.      triamcinolone (KENALOG) 0.1 % cream APPLY TOPICALLY TO THE AFFECTED AREA TWICE DAILY FOR UP TO 2 WEEKS THEN AS NEEDED FOR FLARES       No current facility-administered medications on file prior to visit.       No Known Allergies    Vitals:    01/24/24 1259   BP: 128/82   Pulse: 75   Weight: 134 kg (295 lb)   Height: 188 cm (74\")       Body mass index is 37.88 kg/m².   Constitutional:       Appearance: Well-developed.   Eyes:      General: No scleral icterus.     Conjunctiva/sclera: Conjunctivae normal.   HENT:      Head: Normocephalic and atraumatic.   Neck:      Thyroid: No thyromegaly.      Vascular: Normal carotid pulses. No carotid bruit, hepatojugular reflux or JVD.      Trachea: No tracheal deviation.   Pulmonary:      Effort: No respiratory distress.      Breath sounds: Normal breath sounds. No decreased breath sounds. No wheezing. No rhonchi. No rales.   Chest:      Chest wall: Not tender to palpatation.   Cardiovascular:      Tachycardia present. Regularly irregular rhythm.      No gallop.    Pulses:     Carotid: 2+ bilaterally.     Radial: 2+ bilaterally.    "  Femoral: 2+ bilaterally.     Dorsalis pedis: 2+ bilaterally.     Posterior tibial: 2+ bilaterally.  Edema:     Pretibial: bilateral 2+ edema of the pretibial area.     Ankle: bilateral 2+ edema of the ankle.     Feet: bilateral 2+ edema of the feet.  Abdominal:      General: Bowel sounds are normal. There is no distension.      Palpations: Abdomen is soft.      Tenderness: There is no abdominal tenderness.   Musculoskeletal:         General: No deformity.      Cervical back: Normal range of motion and neck supple. Skin:     Findings: No erythema or rash.   Neurological:      Mental Status: Alert and oriented to person, place, and time.      Sensory: No sensory deficit.   Psychiatric:         Behavior: Behavior normal.               ECG 12 Lead    Date/Time: 1/24/2024 1:33 PM  Performed by: Nuno Muro MD    Authorized by: Nuno Muro MD  Comparison: compared with previous ECG from 12/22/2023  Similar to previous ECG  Rhythm: sinus rhythm  Ectopy: infrequent PVCs  Rate: normal  QRS axis: normal           1/1/24    An abnormal monitor study.  Sinus tachycardia with frequent PVCs accounting for 17% of the overall beats. 117 episodes of NSVT with the longest episode lasting 4 beats. Rare PACs. 30 short atrial runs with the longest episode lasting 19 beats. No atrial fibrillation. No diary events.     12/19/23    Post cardioversion the patient displayed a sinus rhythm.    The cardioversion was successful.    Results for orders placed during the hospital encounter of 12/05/23    Adult Transthoracic Echo Complete w/ Color, Spectral and Contrast if Necessary Per Protocol    Interpretation Summary    Left ventricular systolic function is mildly decreased. Left ventricular ejection fraction appears to be 41 - 45%.    Mildly reduced right ventricular systolic function noted.    The left atrial cavity is severely dilated.    Estimated right ventricular systolic pressure from tricuspid regurgitation is  mildly elevated (35-45 mmHg).      DISCUSSION/SUMMARY  63-year-old male with medical history of obesity, essential hypertension, prediabetes with a hemoglobin A1c of 6.2, who initially presented to me secondary to palpitations and dyspnea and was noted to be in atrial fibrillation with rapid ventricular rate.  Transthoracic echocardiogram has also showed a mildly decreased ejection fraction along with severe left atrial enlargement.  He subsequently underwent direct-current cardioversion and has maintained sinus rhythm since then.  He also states his breathing has improved on the Lasix therapy.    1.  Atrial fibrillation with RVR: Paroxysmal  - Recommend continuing Eliquis 5 mg p.o. every 12 hours.  No recent bleeding complications  - Recommend increasing Toprol therapy to 50 mg p.o. daily.  He is aware.    2.  Obesity: Dietary modification weight loss recommended.  -I have also recommended that he continue to go forward with his sleep evaluation.    3.  Essential hypertension: Continue current dose of olmesartan and Toprol therapy.  No changes indicated.    4.  Acute heart failure with reduced ejection fraction:  -Appears euvolemic.  Plan on repeat echocardiogram in 2 months.  If ejection fraction is still down I would recommend moving forward with heart catheterization.  He is aware.

## 2024-02-09 ENCOUNTER — TELEPHONE (OUTPATIENT)
Dept: GASTROENTEROLOGY | Facility: CLINIC | Age: 64
End: 2024-02-09
Payer: COMMERCIAL

## 2024-02-09 NOTE — TELEPHONE ENCOUNTER
Caller: HOANG GUO    Relationship to patient: Emergency Contact    Best call back number: [phone]     Patient is needing: PATIENT'S SPOUSE CALLED TO ASK IF WE WERE GOING TO CONTACT HIS CARDIOLOGIST FOR CLEARANCE PRIOR TO THE PATIENT'S PROCEDURE ON 3/6/24.  PLEASE CALL BACK AND ADVISE.

## 2024-02-09 NOTE — TELEPHONE ENCOUNTER
Called patients wife and informed her that we will reach out to his cardiologist for clearance. She verbalized understanding.

## 2024-02-15 ENCOUNTER — OFFICE VISIT (OUTPATIENT)
Dept: SLEEP MEDICINE | Facility: HOSPITAL | Age: 64
End: 2024-02-15
Payer: COMMERCIAL

## 2024-02-15 VITALS — HEART RATE: 54 BPM | BODY MASS INDEX: 37.99 KG/M2 | HEIGHT: 74 IN | OXYGEN SATURATION: 98 % | WEIGHT: 296 LBS

## 2024-02-15 DIAGNOSIS — G47.30 SLEEP-DISORDERED BREATHING: Primary | ICD-10-CM

## 2024-02-15 DIAGNOSIS — E66.01 CLASS 2 SEVERE OBESITY DUE TO EXCESS CALORIES WITH SERIOUS COMORBIDITY AND BODY MASS INDEX (BMI) OF 38.0 TO 38.9 IN ADULT: ICD-10-CM

## 2024-02-15 PROCEDURE — G0463 HOSPITAL OUTPT CLINIC VISIT: HCPCS

## 2024-02-17 PROBLEM — E66.01 CLASS 2 SEVERE OBESITY DUE TO EXCESS CALORIES WITH SERIOUS COMORBIDITY AND BODY MASS INDEX (BMI) OF 38.0 TO 38.9 IN ADULT: Status: ACTIVE | Noted: 2024-02-17

## 2024-02-17 PROBLEM — G47.30 SLEEP-DISORDERED BREATHING: Status: ACTIVE | Noted: 2024-02-17

## 2024-02-17 PROBLEM — E66.812 CLASS 2 SEVERE OBESITY DUE TO EXCESS CALORIES WITH SERIOUS COMORBIDITY AND BODY MASS INDEX (BMI) OF 38.0 TO 38.9 IN ADULT: Status: ACTIVE | Noted: 2024-02-17

## 2024-02-22 ENCOUNTER — HOSPITAL ENCOUNTER (OUTPATIENT)
Dept: SLEEP MEDICINE | Facility: HOSPITAL | Age: 64
End: 2024-02-22
Payer: COMMERCIAL

## 2024-02-22 DIAGNOSIS — G47.30 SLEEP-DISORDERED BREATHING: ICD-10-CM

## 2024-02-22 PROCEDURE — 95806 SLEEP STUDY UNATT&RESP EFFT: CPT

## 2024-03-06 ENCOUNTER — OUTSIDE FACILITY SERVICE (OUTPATIENT)
Dept: GASTROENTEROLOGY | Facility: CLINIC | Age: 64
End: 2024-03-06
Payer: COMMERCIAL

## 2024-03-07 ENCOUNTER — OFFICE VISIT (OUTPATIENT)
Dept: SLEEP MEDICINE | Facility: HOSPITAL | Age: 64
End: 2024-03-07
Payer: COMMERCIAL

## 2024-03-07 ENCOUNTER — TELEPHONE (OUTPATIENT)
Dept: SLEEP MEDICINE | Facility: HOSPITAL | Age: 64
End: 2024-03-07
Payer: COMMERCIAL

## 2024-03-07 VITALS — OXYGEN SATURATION: 96 % | HEART RATE: 86 BPM | WEIGHT: 300 LBS | BODY MASS INDEX: 38.5 KG/M2 | HEIGHT: 74 IN

## 2024-03-07 DIAGNOSIS — G47.36 HYPOXEMIA ASSOCIATED WITH SLEEP: ICD-10-CM

## 2024-03-07 DIAGNOSIS — E66.01 CLASS 2 SEVERE OBESITY DUE TO EXCESS CALORIES WITH SERIOUS COMORBIDITY AND BODY MASS INDEX (BMI) OF 38.0 TO 38.9 IN ADULT: ICD-10-CM

## 2024-03-07 DIAGNOSIS — G47.33 OSA (OBSTRUCTIVE SLEEP APNEA): Primary | ICD-10-CM

## 2024-03-07 PROCEDURE — G0463 HOSPITAL OUTPT CLINIC VISIT: HCPCS

## 2024-03-07 NOTE — PROGRESS NOTES
"Follow Up [facility] Disorders [facility] Note     Chief Complaint:  LYLE     Primary Care Physician: Donis Barragan MD    Interval History:   The patient is a 63 y.o. male  who I last saw 2/15/2024 and that note was reviewed.  Based on that evaluation, home sleep study performed 2/22/2024 and severe obstructive sleep apnea with AHI 42.2 events per hour noted.  Additionally, low oxygen saturation 74% and sleep-related hypoxia present for 36.5 minutes.  The patient is here today for follow-up.    He states he is doing better.  He goes to bed at 10 PM and awakens at 4:48 AM.  He will use the restroom during that time.    Review of Systems:    A complete review of systems was done and all were negative with the exception of the above    Social History:    Social History     Socioeconomic History    Marital status:    Tobacco Use    Smoking status: Never    Smokeless tobacco: Former     Types: Chew     Quit date: 12/24/2003   Vaping Use    Vaping status: Never Used   Substance and Sexual Activity    Alcohol use: Not Currently    Drug use: Never       Allergies:  Patient has no known allergies.     Medication Review: His list was reviewed.      Vital Signs:    Vitals:    03/07/24 0821   Pulse: 86   SpO2: 96%   Weight: 136 kg (300 lb)   Height: 188 cm (74\")     Body mass index is 38.52 kg/m².    Physical Exam:    Constitutional:  Well developed 63 y.o. male that appears in no apparent distress.  Awake & oriented times 3.  Normal mood with normal recent and remote memory and normal judgement.  Eyes:  Conjunctivae normal.  Oropharynx: Previously, moist mucous membranes without exudate and a large tongue and narrow posterior pharyngeal opening and class III Mallampati airway.    Self-administered [place] Sleepiness Scale test results: 4  0-5 Lower normal daytime sleepiness  6-10 Higher normal daytime sleepiness  11-12 Mild, 13-15 Moderate, & 16-24 Severe excessive daytime sleepiness     Impression:   Severe obstructive sleep " apnea with sleep-related hypoxia by home sleep study 2/22/2024, weight 296 pounds.  The patient has no complaints of hypersomnolence.    Plan:  Good sleep hygiene measures should be maintained.  Weight loss would be beneficial in this patient who has class II severe obesity by Body mass index is 38.52 kg/m².      After reviewing all with the patient and his wife, I would recommend auto titrating CPAP between 7 and 16 cm water pressure.  An appropriate interface should be selected.  Once set up occurs, I will see the patient back in 6 weeks to review downloads to assure compliance and efficacy.  If the patient has difficulties prior to follow-up, he is to call the Sleep Disorder Center.    Again, I reviewed the importance of diagnosed obstructive sleep apnea and its treatment and the patient who has CDL/DOT.    I answered all of the patient's questions.  The patient will call the [facility] for any problems and will follow up in 6 weeks.      William Johnson MD  Sleep Medicine  03/07/24  08:46 EST

## 2024-03-10 PROBLEM — G47.30 SLEEP-DISORDERED BREATHING: Status: RESOLVED | Noted: 2024-02-17 | Resolved: 2024-03-10

## 2024-03-10 PROBLEM — G47.33 OSA (OBSTRUCTIVE SLEEP APNEA): Status: ACTIVE | Noted: 2024-02-22

## 2024-04-01 ENCOUNTER — HOSPITAL ENCOUNTER (OUTPATIENT)
Dept: CARDIOLOGY | Facility: HOSPITAL | Age: 64
Discharge: HOME | End: 2024-04-01
Admitting: INTERNAL MEDICINE
Payer: COMMERCIAL

## 2024-04-01 VITALS
WEIGHT: 300 LBS | HEART RATE: 70 BPM | SYSTOLIC BLOOD PRESSURE: 112 MMHG | HEIGHT: 74 IN | BODY MASS INDEX: 38.5 KG/M2 | DIASTOLIC BLOOD PRESSURE: 70 MMHG

## 2024-04-01 DIAGNOSIS — I48.0 PAROXYSMAL ATRIAL FIBRILLATION: ICD-10-CM

## 2024-04-01 LAB
AORTIC ARCH: 3.4 CM
AORTIC DIMENSIONLESS INDEX: 0.6 (DI)
ASCENDING AORTA: 4.1 CM
BH CV ECHO LEFT VENTRICLE GLOBAL LONGITUDINAL STRAIN: -21.7 %
BH CV ECHO MEAS - ACS: 1.89 CM
BH CV ECHO MEAS - AO MAX PG: 7.5 MMHG
BH CV ECHO MEAS - AO MEAN PG: 4 MMHG
BH CV ECHO MEAS - AO ROOT DIAM: 3.4 CM
BH CV ECHO MEAS - AO V2 MAX: 137 CM/SEC
BH CV ECHO MEAS - AO V2 VTI: 30.1 CM
BH CV ECHO MEAS - AVA(I,D): 2.8 CM2
BH CV ECHO MEAS - EDV(CUBED): 165.5 ML
BH CV ECHO MEAS - EDV(MOD-SP2): 170 ML
BH CV ECHO MEAS - EDV(MOD-SP4): 181 ML
BH CV ECHO MEAS - EF(MOD-BP): 55.8 %
BH CV ECHO MEAS - EF(MOD-SP2): 61.8 %
BH CV ECHO MEAS - EF(MOD-SP4): 51.4 %
BH CV ECHO MEAS - ESV(CUBED): 60.4 ML
BH CV ECHO MEAS - ESV(MOD-SP2): 65 ML
BH CV ECHO MEAS - ESV(MOD-SP4): 88 ML
BH CV ECHO MEAS - FS: 28.5 %
BH CV ECHO MEAS - IVS/LVPW: 0.88 CM
BH CV ECHO MEAS - IVSD: 1 CM
BH CV ECHO MEAS - LAT PEAK E' VEL: 10.1 CM/SEC
BH CV ECHO MEAS - LV DIASTOLIC VOL/BSA (35-75): 70.1 CM2
BH CV ECHO MEAS - LV MASS(C)D: 232.1 GRAMS
BH CV ECHO MEAS - LV MAX PG: 2.6 MMHG
BH CV ECHO MEAS - LV MEAN PG: 1 MMHG
BH CV ECHO MEAS - LV SYSTOLIC VOL/BSA (12-30): 34.1 CM2
BH CV ECHO MEAS - LV V1 MAX: 80.6 CM/SEC
BH CV ECHO MEAS - LV V1 VTI: 18.8 CM
BH CV ECHO MEAS - LVIDD: 5.5 CM
BH CV ECHO MEAS - LVIDS: 3.9 CM
BH CV ECHO MEAS - LVOT AREA: 4.6 CM2
BH CV ECHO MEAS - LVOT DIAM: 2.41 CM
BH CV ECHO MEAS - LVPWD: 1.14 CM
BH CV ECHO MEAS - MED PEAK E' VEL: 9.2 CM/SEC
BH CV ECHO MEAS - MV A DUR: 0.17 SEC
BH CV ECHO MEAS - MV A MAX VEL: 105 CM/SEC
BH CV ECHO MEAS - MV DEC SLOPE: 489.5 CM/SEC2
BH CV ECHO MEAS - MV DEC TIME: 0.2 SEC
BH CV ECHO MEAS - MV E MAX VEL: 96.4 CM/SEC
BH CV ECHO MEAS - MV E/A: 0.92
BH CV ECHO MEAS - MV MAX PG: 3.5 MMHG
BH CV ECHO MEAS - MV MEAN PG: 1.67 MMHG
BH CV ECHO MEAS - MV P1/2T: 57.7 MSEC
BH CV ECHO MEAS - MV V2 VTI: 25 CM
BH CV ECHO MEAS - MVA(P1/2T): 3.8 CM2
BH CV ECHO MEAS - MVA(VTI): 3.4 CM2
BH CV ECHO MEAS - PA ACC TIME: 0.11 SEC
BH CV ECHO MEAS - PA V2 MAX: 78.7 CM/SEC
BH CV ECHO MEAS - PI END-D VEL: 114 CM/SEC
BH CV ECHO MEAS - PULM A REVS DUR: 0.14 SEC
BH CV ECHO MEAS - PULM A REVS VEL: 26.5 CM/SEC
BH CV ECHO MEAS - PULM DIAS VEL: 33.5 CM/SEC
BH CV ECHO MEAS - PULM S/D: 1.12
BH CV ECHO MEAS - PULM SYS VEL: 37.5 CM/SEC
BH CV ECHO MEAS - QP/QS: 0.6
BH CV ECHO MEAS - RAP SYSTOLE: 3 MMHG
BH CV ECHO MEAS - RV MAX PG: 1.48 MMHG
BH CV ECHO MEAS - RV V1 MAX: 60.8 CM/SEC
BH CV ECHO MEAS - RV V1 VTI: 14.8 CM
BH CV ECHO MEAS - RVOT DIAM: 2.11 CM
BH CV ECHO MEAS - RVSP: 25.3 MMHG
BH CV ECHO MEAS - SI(MOD-SP2): 40.7 ML/M2
BH CV ECHO MEAS - SI(MOD-SP4): 36 ML/M2
BH CV ECHO MEAS - SV(LVOT): 85.7 ML
BH CV ECHO MEAS - SV(MOD-SP2): 105 ML
BH CV ECHO MEAS - SV(MOD-SP4): 93 ML
BH CV ECHO MEAS - SV(RVOT): 51.5 ML
BH CV ECHO MEAS - TAPSE (>1.6): 3 CM
BH CV ECHO MEAS - TR MAX PG: 22.3 MMHG
BH CV ECHO MEAS - TR MAX VEL: 236.2 CM/SEC
BH CV ECHO MEASUREMENTS AVERAGE E/E' RATIO: 9.99
BH CV XLRA - RV BASE: 3.8 CM
BH CV XLRA - RV LENGTH: 8.9 CM
BH CV XLRA - RV MID: 2.9 CM
BH CV XLRA - TDI S': 15.4 CM/SEC
LEFT ATRIUM VOLUME INDEX: 32 ML/M2
SINUS: 3.6 CM
STJ: 2.8 CM

## 2024-04-01 PROCEDURE — 93356 MYOCRD STRAIN IMG SPCKL TRCK: CPT | Performed by: INTERNAL MEDICINE

## 2024-04-01 PROCEDURE — 25510000001 PERFLUTREN (DEFINITY) 8.476 MG IN SODIUM CHLORIDE (PF) 0.9 % 10 ML INJECTION: Performed by: INTERNAL MEDICINE

## 2024-04-01 PROCEDURE — 93356 MYOCRD STRAIN IMG SPCKL TRCK: CPT

## 2024-04-01 PROCEDURE — 93306 TTE W/DOPPLER COMPLETE: CPT

## 2024-04-01 PROCEDURE — 93306 TTE W/DOPPLER COMPLETE: CPT | Performed by: INTERNAL MEDICINE

## 2024-04-01 RX ADMIN — PERFLUTREN 2 ML: 6.52 INJECTION, SUSPENSION INTRAVENOUS at 07:39

## 2024-04-02 ENCOUNTER — TELEPHONE (OUTPATIENT)
Dept: CARDIOLOGY | Facility: CLINIC | Age: 64
End: 2024-04-02
Payer: COMMERCIAL

## 2024-04-03 ENCOUNTER — TELEPHONE (OUTPATIENT)
Dept: CARDIOLOGY | Facility: CLINIC | Age: 64
End: 2024-04-03
Payer: COMMERCIAL

## 2024-04-03 NOTE — TELEPHONE ENCOUNTER
4/3/24   Pt called wanting to know if he needs to continue all current medications since his Echo was good.   Please advise  Pt's martha back # [phone]   Thanks Brayden TORRES

## 2024-04-04 NOTE — TELEPHONE ENCOUNTER
I called pt gave him message from Susie garaz,NP I asked him to pamela back with any question.  Brayden reynoso

## 2024-04-04 NOTE — TELEPHONE ENCOUNTER
Let's have him continue the medications and have him monitor BP at home.  We will reassess on his next office apptmt with me in August.  One of the reasons his heart is stronger is due to the medications.

## 2024-04-10 ENCOUNTER — TELEPHONE (OUTPATIENT)
Dept: GASTROENTEROLOGY | Facility: CLINIC | Age: 64
End: 2024-04-10

## 2024-04-11 ENCOUNTER — TELEPHONE (OUTPATIENT)
Dept: GASTROENTEROLOGY | Facility: CLINIC | Age: 64
End: 2024-04-11
Payer: COMMERCIAL

## 2024-04-11 PROBLEM — Z12.11 ENCOUNTER FOR SCREENING FOR MALIGNANT NEOPLASM OF COLON: Status: ACTIVE | Noted: 2023-10-02

## 2024-04-23 ENCOUNTER — OFFICE VISIT (OUTPATIENT)
Dept: SLEEP MEDICINE | Facility: HOSPITAL | Age: 64
End: 2024-04-23
Payer: COMMERCIAL

## 2024-04-23 ENCOUNTER — TELEPHONE (OUTPATIENT)
Dept: SLEEP MEDICINE | Facility: HOSPITAL | Age: 64
End: 2024-04-23
Payer: COMMERCIAL

## 2024-04-23 VITALS — WEIGHT: 310 LBS | HEIGHT: 74 IN | OXYGEN SATURATION: 97 % | HEART RATE: 79 BPM | BODY MASS INDEX: 39.78 KG/M2

## 2024-04-23 DIAGNOSIS — G47.36 HYPOXEMIA ASSOCIATED WITH SLEEP: ICD-10-CM

## 2024-04-23 DIAGNOSIS — G47.33 OSA (OBSTRUCTIVE SLEEP APNEA): Primary | ICD-10-CM

## 2024-04-23 DIAGNOSIS — E66.01 CLASS 2 SEVERE OBESITY DUE TO EXCESS CALORIES WITH SERIOUS COMORBIDITY AND BODY MASS INDEX (BMI) OF 39.0 TO 39.9 IN ADULT: ICD-10-CM

## 2024-04-23 PROCEDURE — G0463 HOSPITAL OUTPT CLINIC VISIT: HCPCS

## 2024-04-23 PROCEDURE — 99213 OFFICE O/P EST LOW 20 MIN: CPT | Performed by: INTERNAL MEDICINE

## 2024-04-25 RX ORDER — APIXABAN 5 MG/1
5 TABLET, FILM COATED ORAL 2 TIMES DAILY
Qty: 180 TABLET | Refills: 4 | Status: SHIPPED | OUTPATIENT
Start: 2024-04-25

## 2024-05-05 NOTE — PROGRESS NOTES
"Follow Up Sleep Disorders Center Note     Chief Complaint:  LYLE     Primary Care Physician: Donis Barragan MD    Interval History:   The patient is a 63 y.o. male  who I last saw 3/7/2024 and that note was reviewed.  At that visit, auto CPAP between 7 and 16 cm water pressure initiated due to severe obstructive sleep apnea with sleep-related hypoxia by home sleep study 2/22/2024.  The patient is here today for follow-up.  He reports he is mostly unchanged?  He goes to bed at 10:15 PM gets out of bed at 4:50 AM.  He mainly awakens due to his mask.    Review of Systems:    A complete review of systems was done and all were negative with the exception of the above    Social History:    Social History     Socioeconomic History    Marital status:    Tobacco Use    Smoking status: Never    Smokeless tobacco: Former     Types: Chew     Quit date: 12/24/2003   Vaping Use    Vaping status: Never Used   Substance and Sexual Activity    Alcohol use: Not Currently    Drug use: Never       Allergies:  Patient has no known allergies.     Medication Review: His list was reviewed.      Vital Signs:    Vitals:    04/23/24 0758   Pulse: 79   SpO2: 97%   Weight: (!) 141 kg (310 lb)   Height: 188 cm (74\")     Body mass index is 39.8 kg/m².    Physical Exam:    Constitutional:  Well developed 63 y.o. male that appears in no apparent distress.  Awake & oriented times 3.  Normal mood with normal recent and remote memory and normal judgement.  Eyes:  Conjunctivae normal.  Oropharynx: Previously, moist mucous membranes without exudate and a large tongue and narrowed posterior pharyngeal opening and class III Mallampati airway.    Self-administered [place] Sleepiness Scale test results: 4, previously 4  0-5 Lower normal daytime sleepiness  6-10 Higher normal daytime sleepiness  11-12 Mild, 13-15 Moderate, & 16-24 Severe excessive daytime sleepiness     Downloaded PAP Data Evaluated For Therapeutic Response and Compliance:  DME is " [facility] and he uses a fullface mask.  Downloads between 3/13 and 4/21/2024 compliance 100%.  Average usage is 6 hours and 40 minutes.  Average AHI is normal without significant leak.  Average auto CPAP pressure is 18.8 and his ResMed auto CPAP is set at 8-20    Impression:   Severe obstructive sleep apnea with sleep-related hypoxia by home sleep study 2/22/2024, weight 296 pounds, adequately treated with ResMed auto CPAP.  Downloads demonstrate the patient to be at goal with good compliance and usage.  The patient has no complaints of hypersomnolence.    Plan:  Good sleep hygiene measures should be maintained.  Weight loss would be beneficial in this patient who has class II severe obesity by Body mass index is 39.8 kg/m².      After evaluating the patient and assessing results available, the patient is benefiting from the treatment being provided.     The patient will continue ResMed auto CPAP.  Potential side effects of not using PAP therapy reviewed and addressed as needed.  After clinical evaluation and review of downloads, I recommend no changes to the patient's pressures.  However, humidity increased to 6 due to dryness.  A new prescription will be sent to the patient's DME as needed.    Due to the patient's significant sleep-related hypoxia, overnight oximetry will be checked to prove sleep-related hypoxia adequately treated with auto CPAP.    The [facility] Center staff did review and refit the patient's fullface mask.    I answered all of the patient's questions.  The patient will call the [facility] Disorder Center for any problems and will follow up in 4 months.      William Johnson MD  Sleep Medicine  05/05/24  09:20 EDT

## 2024-07-02 ENCOUNTER — HOSPITAL ENCOUNTER (OUTPATIENT)
Facility: HOSPITAL | Age: 64
Setting detail: HOSPITAL OUTPATIENT SURGERY
Discharge: HOME | End: 2024-07-02
Attending: INTERNAL MEDICINE | Admitting: INTERNAL MEDICINE
Payer: COMMERCIAL

## 2024-07-02 ENCOUNTER — ANESTHESIA (OUTPATIENT)
Dept: GASTROENTEROLOGY | Facility: HOSPITAL | Age: 64
End: 2024-07-02
Payer: COMMERCIAL

## 2024-07-02 ENCOUNTER — ANESTHESIA EVENT (OUTPATIENT)
Dept: GASTROENTEROLOGY | Facility: HOSPITAL | Age: 64
End: 2024-07-02
Payer: COMMERCIAL

## 2024-07-02 VITALS
HEIGHT: 74 IN | SYSTOLIC BLOOD PRESSURE: 116 MMHG | HEART RATE: 65 BPM | BODY MASS INDEX: 38.07 KG/M2 | RESPIRATION RATE: 18 BRPM | WEIGHT: 296.6 LBS | DIASTOLIC BLOOD PRESSURE: 77 MMHG | OXYGEN SATURATION: 98 %

## 2024-07-02 PROCEDURE — S0260 H&P FOR SURGERY: HCPCS | Performed by: INTERNAL MEDICINE

## 2024-07-02 PROCEDURE — 25810000003 LACTATED RINGERS PER 1000 ML: Performed by: NURSE ANESTHETIST, CERTIFIED REGISTERED

## 2024-07-02 PROCEDURE — 25810000003 LACTATED RINGERS PER 1000 ML: Performed by: INTERNAL MEDICINE

## 2024-07-02 PROCEDURE — 25010000002 PROPOFOL 200 MG/20ML EMULSION: Performed by: NURSE ANESTHETIST, CERTIFIED REGISTERED

## 2024-07-02 PROCEDURE — 25010000002 PROPOFOL 1000 MG/100ML EMULSION: Performed by: NURSE ANESTHETIST, CERTIFIED REGISTERED

## 2024-07-02 RX ORDER — LIDOCAINE HYDROCHLORIDE 20 MG/ML
INJECTION, SOLUTION INFILTRATION; PERINEURAL AS NEEDED
Status: DISCONTINUED | OUTPATIENT
Start: 2024-07-02 | End: 2024-07-02 | Stop reason: SURG

## 2024-07-02 RX ORDER — PROPOFOL 10 MG/ML
INJECTION, EMULSION INTRAVENOUS CONTINUOUS PRN
Status: DISCONTINUED | OUTPATIENT
Start: 2024-07-02 | End: 2024-07-02 | Stop reason: SURG

## 2024-07-02 RX ORDER — SODIUM CHLORIDE 0.9 % (FLUSH) 0.9 %
10 SYRINGE (ML) INJECTION AS NEEDED
Status: DISCONTINUED | OUTPATIENT
Start: 2024-07-02 | End: 2024-07-02 | Stop reason: HOSPADM

## 2024-07-02 RX ORDER — SODIUM CHLORIDE, SODIUM LACTATE, POTASSIUM CHLORIDE, CALCIUM CHLORIDE 600; 310; 30; 20 MG/100ML; MG/100ML; MG/100ML; MG/100ML
INJECTION, SOLUTION INTRAVENOUS CONTINUOUS PRN
Status: DISCONTINUED | OUTPATIENT
Start: 2024-07-02 | End: 2024-07-02 | Stop reason: SURG

## 2024-07-02 RX ORDER — PROPOFOL 10 MG/ML
INJECTION, EMULSION INTRAVENOUS AS NEEDED
Status: DISCONTINUED | OUTPATIENT
Start: 2024-07-02 | End: 2024-07-02 | Stop reason: SURG

## 2024-07-02 RX ORDER — SODIUM CHLORIDE, SODIUM LACTATE, POTASSIUM CHLORIDE, CALCIUM CHLORIDE 600; 310; 30; 20 MG/100ML; MG/100ML; MG/100ML; MG/100ML
1000 INJECTION, SOLUTION INTRAVENOUS CONTINUOUS
Status: DISCONTINUED | OUTPATIENT
Start: 2024-07-02 | End: 2024-07-02 | Stop reason: HOSPADM

## 2024-07-02 RX ORDER — LIDOCAINE HYDROCHLORIDE 10 MG/ML
0.5 INJECTION, SOLUTION INFILTRATION; PERINEURAL ONCE AS NEEDED
Status: DISCONTINUED | OUTPATIENT
Start: 2024-07-02 | End: 2024-07-02 | Stop reason: HOSPADM

## 2024-07-02 RX ADMIN — PROPOFOL INJECTABLE EMULSION 70 MG: 10 INJECTION, EMULSION INTRAVENOUS at 07:40

## 2024-07-02 RX ADMIN — SODIUM CHLORIDE, POTASSIUM CHLORIDE, SODIUM LACTATE AND CALCIUM CHLORIDE 1000 ML: 600; 310; 30; 20 INJECTION, SOLUTION INTRAVENOUS at 07:15

## 2024-07-02 RX ADMIN — LIDOCAINE HYDROCHLORIDE 100 MG: 20 INJECTION, SOLUTION INFILTRATION; PERINEURAL at 07:40

## 2024-07-02 RX ADMIN — PROPOFOL 250 MCG/KG/MIN: 10 INJECTION, EMULSION INTRAVENOUS at 07:39

## 2024-07-02 RX ADMIN — SODIUM CHLORIDE, POTASSIUM CHLORIDE, SODIUM LACTATE AND CALCIUM CHLORIDE: 600; 310; 30; 20 INJECTION, SOLUTION INTRAVENOUS at 07:31

## 2024-07-02 NOTE — ANESTHESIA PREPROCEDURE EVALUATION
Anesthesia Evaluation     Patient summary reviewed and Nursing notes reviewed   no history of anesthetic complications:   NPO Solid Status: > 8 hours  NPO Liquid Status: > 8 hours           Airway   Mallampati: III  Possible difficult intubation  Dental    (+) partials    Comment: Partial removed     Pulmonary    (+) ,sleep apnea on CPAP  (-) asthma, not a smoker  Cardiovascular   Exercise tolerance: good (4-7 METS)    PT is on anticoagulation therapy    (+) hypertension, dysrhythmias Paroxysmal Atrial Fib, CHF Diastolic >=55%  (-) CAD, angina, MATTHEW      Neuro/Psych  (-) seizures, CVA  GI/Hepatic/Renal/Endo    (+) morbid obesity  (-) liver disease, no renal disease, diabetes, no thyroid disorder    Musculoskeletal     Abdominal    Substance History      OB/GYN          Other                    Anesthesia Plan    ASA 3     MAC   total IV anesthesia  intravenous induction     Anesthetic plan, risks, benefits, and alternatives have been provided, discussed and informed consent has been obtained with: patient.    CODE STATUS:

## 2024-07-02 NOTE — ANESTHESIA POSTPROCEDURE EVALUATION
"Patient: Nghia Jimenez    Procedure Summary       Date: 07/02/24 Room / Location: [facility] ENDOSCOPY 8 /  [facility]    Anesthesia Start: 0730 Anesthesia Stop: 0753    Procedure: COLONOSCOPY TO CECUM  AND TERM. ILEUM Diagnosis:       Encounter for screening for malignant neoplasm of colon      (Encounter for screening for malignant neoplasm of colon [Z12.11])    Surgeons: Jeferson Jimenes MD Provider: Boy Moore MD    Anesthesia Type: MAC ASA Status: 3            Anesthesia Type: MAC    Vitals  Vitals Value Taken Time   /77 07/02/24 0814   Temp     Pulse 64 07/02/24 0819   Resp 18 07/02/24 0814   SpO2 98 % 07/02/24 0819   Vitals shown include unfiled device data.        Post Anesthesia Care and Evaluation    Level of consciousness: awake and alert  Pain management: adequate    Airway patency: patent  Anesthetic complications: No anesthetic complications  PONV Status: none  Cardiovascular status: acceptable  Respiratory status: acceptable  Hydration status: acceptable    Comments: /77 (BP Location: Left arm, Patient Position: Lying)   Pulse 65   Resp 18   Ht 188 cm (74\")   Wt 135 kg (296 lb 9.6 oz)   SpO2 98%   BMI 38.08 kg/m²       "

## 2024-07-02 NOTE — H&P
"[facility]  Pre Procedure History & Physical    Chief Complaint:   Time for my colonoscopy    Subjective     HPI:   63 y.o. male presenting to endoscopy unit today for surveillance colonoscopy.    Past Medical History:   Past Medical History:   Diagnosis Date    Atrial fibrillation     Hypertension     Hypoxemia associated with sleep     LYLE (obstructive sleep apnea) 02/22/2024    Home sleep study.  Weight 296 pounds.  Severe LYLE with AHI 42.2 events per hour.  Low oxygen saturation 74% and sleep-related hypoxia present for 36.5 minutes.  The patient snored 60.4% total monitoring time.       Family History:  Family History   Problem Relation Age of Onset    Heart attack Mother     Heart disease Mother     Coarctation of the aorta Mother     No Known Problems Father     Malig Hyperthermia Neg Hx        Social History:   reports that he has never smoked. He quit smokeless tobacco use about 20 years ago.  His smokeless tobacco use included chew. He reports that he does not currently use alcohol. He reports that he does not use drugs.    Medications:   Medications Prior to Admission   Medication Sig Dispense Refill Last Dose    apixaban (Eliquis) 5 MG tablet tablet TAKE 1 TABLET BY MOUTH TWICE DAILY 180 tablet 4 6/29/2024    diphenhydrAMINE (BENADRYL) 25 mg capsule Take 1 capsule by mouth Every 6 (Six) Hours As Needed for Itching.   Past Week    furosemide (LASIX) 40 MG tablet Take 1 tablet by mouth Daily. 30 tablet 11 7/1/2024    metoprolol succinate XL (TOPROL-XL) 50 MG 24 hr tablet Take 1 tablet by mouth Daily. 90 tablet 1 7/1/2024    olmesartan (BENICAR) 20 MG tablet Take 1 tablet by mouth Daily.   7/2/2024    triamcinolone (KENALOG) 0.1 % cream APPLY TOPICALLY TO THE AFFECTED AREA TWICE DAILY FOR UP TO 2 WEEKS THEN AS NEEDED FOR FLARES   More than a month       Allergies:  Patient has no known allergies.    Objective     Blood pressure 130/84, pulse 59, resp. rate 16, height 188 cm (74\"), " weight 135 kg (296 lb 9.6 oz), SpO2 97%.  Physical Exam:   General: patient awake, alert and cooperative    Assessment & Plan     Diagnosis:  Personal hx of colon polyps    Anticipated Surgical Procedure:  Colonoscopy    The risks, benefits, and alternatives of this procedure have been discussed with the patient or the responsible party- the patient understands and agrees to proceed.

## 2024-07-02 NOTE — DISCHARGE INSTRUCTIONS
For the rest of the day patient needs to be with a responsible adult.    For the rest of the day DO NOT work, drive, operate heavy machinery, drink alcohol, make important decisions or sign legal documents.    Advance to regular diet as tolerated, if your stomach is upset start with a light/bland diet.    Follow recommendations on procedure report if provided by your doctor.    Call Dr Jimenes for problems [phone]    If these problems occur come to ER: large amounts of bleeding, trouble breathing, repeated vomiting, severe unrelieved pain, fever or chills.

## 2024-07-19 RX ORDER — METOPROLOL SUCCINATE 50 MG/1
50 TABLET, EXTENDED RELEASE ORAL DAILY
Qty: 90 TABLET | Refills: 1 | Status: SHIPPED | OUTPATIENT
Start: 2024-07-19

## 2024-08-02 ENCOUNTER — OFFICE VISIT (OUTPATIENT)
Dept: CARDIOLOGY | Facility: CLINIC | Age: 64
End: 2024-08-02
Payer: COMMERCIAL

## 2024-08-02 VITALS
WEIGHT: 294.4 LBS | HEIGHT: 74 IN | BODY MASS INDEX: 37.78 KG/M2 | DIASTOLIC BLOOD PRESSURE: 70 MMHG | HEART RATE: 93 BPM | SYSTOLIC BLOOD PRESSURE: 114 MMHG

## 2024-08-02 DIAGNOSIS — I73.9 PERIPHERAL VASCULAR DISEASE: ICD-10-CM

## 2024-08-02 DIAGNOSIS — I10 PRIMARY HYPERTENSION: ICD-10-CM

## 2024-08-02 DIAGNOSIS — I49.3 FREQUENT PVCS: ICD-10-CM

## 2024-08-02 DIAGNOSIS — G47.33 OSA (OBSTRUCTIVE SLEEP APNEA): ICD-10-CM

## 2024-08-02 DIAGNOSIS — I73.9 PVD (PERIPHERAL VASCULAR DISEASE) WITH CLAUDICATION: Primary | ICD-10-CM

## 2024-08-02 DIAGNOSIS — I48.0 PAROXYSMAL ATRIAL FIBRILLATION: ICD-10-CM

## 2024-08-02 PROCEDURE — 99214 OFFICE O/P EST MOD 30 MIN: CPT | Performed by: NURSE PRACTITIONER

## 2024-08-02 PROCEDURE — 93000 ELECTROCARDIOGRAM COMPLETE: CPT | Performed by: NURSE PRACTITIONER

## 2024-08-02 RX ORDER — METOPROLOL SUCCINATE 50 MG/1
50 TABLET, EXTENDED RELEASE ORAL DAILY
Qty: 90 TABLET | Refills: 3 | Status: SHIPPED | OUTPATIENT
Start: 2024-08-02

## 2024-08-02 RX ORDER — FUROSEMIDE 40 MG/1
40 TABLET ORAL DAILY
Qty: 90 TABLET | Refills: 3 | Status: SHIPPED | OUTPATIENT
Start: 2024-08-02

## 2024-08-02 RX ORDER — OLMESARTAN MEDOXOMIL 20 MG/1
20 TABLET ORAL DAILY
Qty: 90 TABLET | Refills: 3 | Status: SHIPPED | OUTPATIENT
Start: 2024-08-02

## 2024-08-02 NOTE — PROGRESS NOTES
Date of Office Visit: 2024  Encounter Provider: TIERRA Rogers  Place of Service: [facility] CARDIOLOGY  Patient Name: Nghia Jimenez  :1960    No chief complaint on file.  : Paroxysmal atrial fibrillation    HPI: Nghia Jimenez is a 63 y.o. male who is a patient of  Dr. Muro.  He is new to me today and presents for a 6-month office follow-up.  He has a history of hypertension, obesity, LYLE and paroxysmal atrial fibrillation.  He underwent successful cardioversion in 2023.  He wore a monitor with no recurrence of atrial fibrillation but resting sinus rate in the 90s with PVCs and PACs.    Today, patient presents with his wife.  EKG shows atrial fibrillation with controlled rate and PVCs.  Denies any chest pain or palpitations.  Does have some dyspnea on exertion on a pretty regular basis.  He is concerned that his toes tend to be dark in color.  He does have varicose veins.  Pulses are palpable.  Patient is on Eliquis.  Recent lipid panel shows LDL less than 70.  Blood pressure well-controlled.    Previous testing and notes have been reviewed by me.   Past Medical History:   Diagnosis Date    Atrial fibrillation     Hypertension     Hypoxemia associated with sleep     LYLE (obstructive sleep apnea) 2024    Home sleep study.  Weight 296 pounds.  Severe LYLE with AHI 42.2 events per hour.  Low oxygen saturation 74% and sleep-related hypoxia present for 36.5 minutes.  The patient snored 60.4% total monitoring time.       Past Surgical History:   Procedure Laterality Date    BASAL CELL CARCINOMA EXCISION  2016    CARDIOVERSION      COLONOSCOPY N/A 2024    Procedure: COLONOSCOPY TO CECUM  AND TERM. ILEUM;  Surgeon: Jeferson Jimenes MD;  Location: [facility] ENDOSCOPY;  Service: Gastroenterology;  Laterality: N/A;  PRE OP - SCREENING  POST OP - DIVERTICULOSIS, INT HEMORRHOIDS    GALLBLADDER SURGERY      KIDNEY STONE SURGERY      KNEE SURGERY Right  "1994       Social History     Socioeconomic History    Marital status:    Tobacco Use    Smoking status: Never     Passive exposure: Past    Smokeless tobacco: Former     Types: Chew     Quit date: 12/24/2003    Tobacco comments:     Stopped chew years ago     Vaping Use    Vaping status: Never Used   Substance and Sexual Activity    Alcohol use: Not Currently    Drug use: Never       Family History   Problem Relation Age of Onset    Heart attack Mother     Heart disease Mother     Coarctation of the aorta Mother     No Known Problems Father     Malig Hyperthermia Neg Hx        Review of Systems   Constitutional: Negative.   HENT: Negative.     Eyes: Negative.    Cardiovascular:  Positive for dyspnea on exertion and irregular heartbeat.   Respiratory: Negative.     Endocrine: Negative.    Hematologic/Lymphatic:        Eliquis   Skin: Negative.    Musculoskeletal: Negative.    Gastrointestinal: Negative.    Genitourinary: Negative.    Neurological: Negative.    Psychiatric/Behavioral: Negative.     Allergic/Immunologic: Negative.        No Known Allergies      Current Outpatient Medications:     apixaban (Eliquis) 5 MG tablet tablet, TAKE 1 TABLET BY MOUTH TWICE DAILY, Disp: 180 tablet, Rfl: 4    diphenhydrAMINE (BENADRYL) 25 mg capsule, Take 1 capsule by mouth Every 6 (Six) Hours As Needed for Itching., Disp: , Rfl:     furosemide (LASIX) 40 MG tablet, Take 1 tablet by mouth Daily., Disp: 90 tablet, Rfl: 3    metoprolol succinate XL (TOPROL-XL) 50 MG 24 hr tablet, Take 1 tablet by mouth Daily., Disp: 90 tablet, Rfl: 3    olmesartan (BENICAR) 20 MG tablet, Take 1 tablet by mouth Daily., Disp: 90 tablet, Rfl: 3    triamcinolone (KENALOG) 0.1 % cream, APPLY TOPICALLY TO THE AFFECTED AREA TWICE DAILY FOR UP TO 2 WEEKS THEN AS NEEDED FOR FLARES, Disp: , Rfl:       Objective:     Vitals:    08/02/24 1410   BP: 114/70   Pulse: 93   Weight: 134 kg (294 lb 6.4 oz)   Height: 188 cm (74.02\")     Body mass index is 37.78 " kg/m².    2D Echocardiogram 4/1/2024:    Left ventricular systolic function is normal. Calculated left ventricular EF = 55.8%    Left ventricular diastolic function was normal.     48 Hr Holter monitor 1/1/24:    An abnormal monitor study.  Sinus tachycardia with frequent PVCs accounting for 17% of the overall beats. 117 episodes of NSVT with the longest episode lasting 4 beats. Rare PACs. 30 short atrial runs with the longest episode lasting 19 beats. No atrial fibrillation. No diary events.      12/19/23    Post cardioversion the patient displayed a sinus rhythm.    The cardioversion was successful.    2D Echocardiogram 12/05/2023:    Left ventricular systolic function is mildly decreased. Left ventricular ejection fraction appears to be 41 - 45%.    Mildly reduced right ventricular systolic function noted.    The left atrial cavity is severely dilated.    Estimated right ventricular systolic pressure from tricuspid regurgitation is mildly elevated (35-45 mmHg).       PHYSICAL EXAM:    Constitutional:       Appearance: Healthy appearance. Not in distress.   Neck:      Vascular: No JVR. JVD normal.   Pulmonary:      Effort: Pulmonary effort is normal.      Breath sounds: Normal breath sounds. No wheezing. No rhonchi. No rales.   Chest:      Chest wall: Not tender to palpatation.   Cardiovascular:      PMI at left midclavicular line. Normal rate. Irregularly irregular rhythm. Normal S1. Normal S2.       Murmurs: There is no murmur.      No gallop.  No click. No rub.   Pulses:     Intact distal pulses.   Edema:     Peripheral edema absent.   Abdominal:      General: Bowel sounds are normal.      Palpations: Abdomen is soft.      Tenderness: There is no abdominal tenderness.   Musculoskeletal: Normal range of motion.         General: No tenderness. Skin:     General: Skin is warm and dry.   Neurological:      General: No focal deficit present.      Mental Status: Alert and oriented to person, place and time.            ECG 12 Lead    Date/Time: 8/2/2024 4:42 PM  Performed by: Paige Malik APRN    Authorized by: Paige Malik APRN  Comparison: compared with previous ECG from 1/24/2024  Rhythm: atrial fibrillation  Ectopy: multifocal PVCs  BPM: 93            Assessment:       Diagnosis Plan   1. PVD (peripheral vascular disease) with claudication  Doppler Arterial Multi Level Lower Extremity - Bilateral CAR      2. Paroxysmal atrial fibrillation        3. Primary hypertension        4. Frequent PVCs        5. LYLE (obstructive sleep apnea) treated with auto CPAP        6. Peripheral vascular disease          No orders of the defined types were placed in this encounter.         Plan:       Paroxysmal atrial fibrillation: Anticoagulation with Eliquis 5 mg twice daily.  Toprol was increased on last office visit.  EKG shows atrial fibrillation with controlled rate and PVCs  PVCs, PACs: As above.  Symptomatic  3.  Obesity: Dietary recommendations and increase exercise activity.  4.  Hypertension: Controlled.  Monitors BP at home.  5.  Acute heart failure with reduced ejection fraction: Appears to be tachycardia deviated.  Repeat echocardiogram 2 months later shows resolution of LV function to an EF of 56%.  Euvolemic.  6.  PVD: Bilateral ABIs as patient has noticed darkening of toes.  Has claudication.  Varicose veins present  7.  Obstructive sleep apnea: CPAP compliant    Mr. Jimenez will follow-up with Dr. Muro in 6 months.  He will call sooner for any questions or concerns.         Your medication list            Accurate as of August 2, 2024  4:41 PM. If you have any questions, ask your nurse or doctor.                CONTINUE taking these medications        Instructions Last Dose Given Next Dose Due   diphenhydrAMINE 25 mg capsule  Commonly known as: BENADRYL      Take 1 capsule by mouth Every 6 (Six) Hours As Needed for Itching.       Eliquis 5 MG tablet tablet  Generic drug: apixaban      TAKE 1 TABLET BY MOUTH  TWICE DAILY       furosemide 40 MG tablet  Commonly known as: LASIX      Take 1 tablet by mouth Daily.       metoprolol succinate XL 50 MG 24 hr tablet  Commonly known as: TOPROL-XL      Take 1 tablet by mouth Daily.       olmesartan 20 MG tablet  Commonly known as: BENICAR      Take 1 tablet by mouth Daily.       triamcinolone 0.1 % cream  Commonly known as: KENALOG      APPLY TOPICALLY TO THE AFFECTED AREA TWICE DAILY FOR UP TO 2 WEEKS THEN AS NEEDED FOR FLARES                 Where to Get Your Medications        These medications were sent to [id number] - [place], [place] - [address] [place] [address] - [phone]  - [phone] FX  [address] [facility] [address]      Phone: [phone]   furosemide 40 MG tablet  metoprolol succinate XL 50 MG 24 hr tablet  olmesartan 20 MG tablet           As always, it has been a pleasure to participate in your patient's care.      Sincerely,       TIERRA Paredes

## 2024-08-09 ENCOUNTER — HOSPITAL ENCOUNTER (OUTPATIENT)
Dept: CARDIOLOGY | Facility: HOSPITAL | Age: 64
Discharge: HOME | End: 2024-08-09
Admitting: NURSE PRACTITIONER
Payer: COMMERCIAL

## 2024-08-09 DIAGNOSIS — I73.9 PVD (PERIPHERAL VASCULAR DISEASE) WITH CLAUDICATION: ICD-10-CM

## 2024-08-09 LAB
BH CV LOWER ARTERIAL LEFT ABI RATIO: 1.15
BH CV LOWER ARTERIAL LEFT DORSALIS PEDIS SYS MAX: 111
BH CV LOWER ARTERIAL LEFT GREAT TOE SYS MAX: 128
BH CV LOWER ARTERIAL LEFT POST TIBIAL SYS MAX: 120
BH CV LOWER ARTERIAL LEFT TBI RATIO: 1.23
BH CV LOWER ARTERIAL RIGHT ABI RATIO: 1.3
BH CV LOWER ARTERIAL RIGHT DORSALIS PEDIS SYS MAX: 110
BH CV LOWER ARTERIAL RIGHT GREAT TOE SYS MAX: 121
BH CV LOWER ARTERIAL RIGHT POST TIBIAL SYS MAX: 135
BH CV LOWER ARTERIAL RIGHT TBI RATIO: 1.16
UPPER ARTERIAL LEFT ARM BRACHIAL SYS MAX: 103
UPPER ARTERIAL RIGHT ARM BRACHIAL SYS MAX: 104

## 2024-08-09 PROCEDURE — 93922 UPR/L XTREMITY ART 2 LEVELS: CPT

## 2024-08-20 ENCOUNTER — TELEPHONE (OUTPATIENT)
Age: 64
End: 2024-08-20
Payer: COMMERCIAL

## 2024-08-20 DIAGNOSIS — I48.0 PAROXYSMAL ATRIAL FIBRILLATION: Primary | ICD-10-CM

## 2024-08-20 NOTE — TELEPHONE ENCOUNTER
Pt's wife Rashida called. She had some question about his heart rhythm.    He saw Susie on 8/2/24. He was in Afib with PVCs at a rate of 93.    She is wanting to know if he needs to be converted back to sinus rhythm because of the Afib causing strain on his heart. He has complained of increased MATTHEW since going back into Afib.     He had been cardioverted successfully on 12/19/23 and remained in sinus until recently.    Rashida would like to speak with you when you get a chance.  She can be reached at P#[phone] or he can at P#[phone].    Thanks,  Asia

## 2024-08-20 NOTE — TELEPHONE ENCOUNTER
Please let Rashida know that my recommendation would be to actually have him see one of the electrophysiologist.  I would not recommend continuing to cardiovert him and over again.  I think we need a more permanent solution.  I will place the consult

## 2024-08-21 ENCOUNTER — TELEPHONE (OUTPATIENT)
Dept: CARDIOLOGY | Facility: CLINIC | Age: 64
End: 2024-08-21
Payer: COMMERCIAL

## 2024-08-21 NOTE — TELEPHONE ENCOUNTER
Caller: Nghia Jimenez     Relationship: PATIENT    Best call back number: [phone]    What is your medical concern? PT HAS QUESTIONS ABOUT WHY HE IS SEEING DR. ROBLEDO AND WHAT HE IS GOING TO DO AT THE NEXT APPT. PT WOULD LIKE A CALL BACK FROM STEPHEN.

## 2024-08-22 ENCOUNTER — OFFICE VISIT (OUTPATIENT)
Dept: SLEEP MEDICINE | Facility: HOSPITAL | Age: 64
End: 2024-08-22
Payer: COMMERCIAL

## 2024-08-22 VITALS — OXYGEN SATURATION: 98 % | HEART RATE: 91 BPM | HEIGHT: 74 IN | BODY MASS INDEX: 38.35 KG/M2 | WEIGHT: 298.8 LBS

## 2024-08-22 DIAGNOSIS — E66.01 CLASS 2 SEVERE OBESITY DUE TO EXCESS CALORIES WITH SERIOUS COMORBIDITY AND BODY MASS INDEX (BMI) OF 38.0 TO 38.9 IN ADULT: ICD-10-CM

## 2024-08-22 DIAGNOSIS — G47.36 HYPOXEMIA ASSOCIATED WITH SLEEP: ICD-10-CM

## 2024-08-22 DIAGNOSIS — G47.33 OSA (OBSTRUCTIVE SLEEP APNEA): Primary | ICD-10-CM

## 2024-08-22 PROCEDURE — G0463 HOSPITAL OUTPT CLINIC VISIT: HCPCS

## 2024-08-22 PROCEDURE — 99213 OFFICE O/P EST LOW 20 MIN: CPT | Performed by: INTERNAL MEDICINE

## 2024-08-22 NOTE — PROGRESS NOTES
"Follow Up Sleep Disorders Center Note     Chief Complaint:  LYLE     Primary Care Physician: Donis Barragan MD    Interval History:   The patient is a 63 y.o. male who I last saw 4/23/2024 and that note was reviewed.  The patient reports he is doing well without new complaints.  He goes to bed at 10:15 PM and gets out of bed at 5 AM.  Occasionally he will awaken to use the bathroom once.  He also awakens to adjust his mask.    Review of Systems:    A complete review of systems was done and all were negative with the exception of the above    Social History:    Social History     Socioeconomic History    Marital status:    Tobacco Use    Smoking status: Never     Passive exposure: Past    Smokeless tobacco: Former     Types: Chew     Quit date: 12/24/2003    Tobacco comments:     Stopped chew years ago     Vaping Use    Vaping status: Never Used   Substance and Sexual Activity    Alcohol use: Not Currently    Drug use: Never       Allergies:  Patient has no known allergies.     Medication Review: His list was reviewed.      Vital Signs:    Vitals:    08/22/24 0826   Pulse: 91   SpO2: 98%   Weight: 136 kg (298 lb 12.8 oz)   Height: 188 cm (74.02\")     Body mass index is 38.34 kg/m².    Physical Exam:    Constitutional:  Well developed 63 y.o. male that appears in no apparent distress.  Awake & oriented times 3.  Normal mood with normal recent and remote memory and normal judgement.  Eyes:  Conjunctivae normal.  Oropharynx: Previously, moist mucous membranes without exudate and a large tongue and narrow posterior pharyngeal opening class III Mallampati airway.    Self-administered [place] Sleepiness Scale test results: 8  0-5 Lower normal daytime sleepiness  6-10 Higher normal daytime sleepiness  11-12 Mild, 13-15 Moderate, & 16-24 Severe excessive daytime sleepiness     Downloaded PAP Data Evaluated For Therapeutic Response and Compliance:  DME is [employer] and he uses a fullface mask.  Downloads between 5/23 and " 8/20/2024 compliance 100%.  Average usage is 6 hours and 49 minutes.  Average AHI is normal without leak.  Average auto CPAP pressure is 18 and his ResMed auto CPAP is 8-20    I have reviewed the above results and compared them with the patient's last downloads and reviewed with the patient.    Impression:   Severe obstructive sleep apnea with sleep-related hypoxia by home sleep study 2/22/2024, weight 296 pounds, adequately treated with ResMed auto CPAP.  Downloads demonstrate the patient to be at goal with good compliance and usage.  The patient has some complaints of hypersomnolence.     Plan:  Good sleep hygiene measures should be maintained.  Weight loss would be beneficial in this patient who has class II severe obesity by Body mass index is 38.34 kg/m².      After evaluating the patient and assessing results available, the patient is benefiting from the treatment being provided.     The patient will continue ResMed auto CPAP.  Potential side effects of not using PAP therapy reviewed and addressed as needed.  After clinical evaluation and review of downloads, I recommend no changes to the patient's pressures.  A new prescription will be sent to the patient's DME.    I answered all of the patient's questions.  The patient will call the Sleep Disorder Center for any problems and will follow up in 1 year.      William Johnson MD  Sleep Medicine  08/23/24  17:22 EDT

## 2024-08-28 NOTE — TELEPHONE ENCOUNTER
See 8/21/24 telephone message.    Brayden TORRES spoke with the pt about getting in with EP.    Asia

## 2024-09-24 ENCOUNTER — OFFICE VISIT (OUTPATIENT)
Age: 64
End: 2024-09-24
Payer: COMMERCIAL

## 2024-09-24 ENCOUNTER — TRANSCRIBE ORDERS (OUTPATIENT)
Dept: CARDIOLOGY | Facility: CLINIC | Age: 64
End: 2024-09-24
Payer: COMMERCIAL

## 2024-09-24 VITALS
BODY MASS INDEX: 37.6 KG/M2 | WEIGHT: 293 LBS | SYSTOLIC BLOOD PRESSURE: 100 MMHG | HEIGHT: 74 IN | DIASTOLIC BLOOD PRESSURE: 62 MMHG | HEART RATE: 71 BPM

## 2024-09-24 DIAGNOSIS — Z01.810 PRE-OPERATIVE CARDIOVASCULAR EXAMINATION: Primary | ICD-10-CM

## 2024-09-24 DIAGNOSIS — Z13.6 SCREENING FOR ISCHEMIC HEART DISEASE: ICD-10-CM

## 2024-09-24 DIAGNOSIS — I48.19 ATRIAL FIBRILLATION, PERSISTENT: Primary | ICD-10-CM

## 2024-09-24 RX ORDER — LORATADINE 10 MG/1
10 TABLET ORAL DAILY
COMMUNITY

## 2024-09-24 NOTE — PROGRESS NOTES
Date of Office Visit: 2024  Encounter Provider: Kory Max MD  Place of Service: [facility] CARDIOLOGY  Patient Name: Nghia Jimenez  :1960    Chief Complaint   Patient presents with    paroxysmal AFIB   :     HPI: Nghia Jimenez is a 63 y.o. male who presents today for persistent atrial fibrillation.    Mr. Jimenez had noticed increasing shortness of breath for several months.  He presented to cardiology clinic last November and was found to be in atrial fibrillation, and also felt to have some heart failure.  He was treated with diuretics and cardioversion.  He reports an improvement in his symptoms.    He generally done okay this year, he was in sinus rhythm in January.  He has felt more fatigued.  But not noticed any tachycardia or irregular heartbeat.  When he returned to cardiology clinic in August for routine follow-up he was back in atrial fibrillation.    We are unsure the duration of his atrial fibrillation, he had never been told about it before November.  On echocardiogram his left atrium was severely dilated.    He has not been hospitalized.  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  Weight 296 pounds.  Severe LYLE with AHI 42.2 events per hour.  Low oxygen saturation 74% and sleep-related hypoxia present for 36.5 minutes.  The patient snored 60.4% total monitoring time.       Past Surgical History:   Procedure Laterality Date    BASAL CELL CARCINOMA EXCISION  2016    CARDIOVERSION      COLONOSCOPY N/A 2024    Procedure: COLONOSCOPY TO CECUM  AND TERM. ILEUM;  Surgeon: Jeferson Jimenes MD;  Location: [facility] ENDOSCOPY;  Service: Gastroenterology;  Laterality: N/A;  PRE OP - SCREENING  POST OP - DIVERTICULOSIS, INT HEMORRHOIDS     "GALLBLADDER SURGERY  2022    KIDNEY STONE SURGERY  1995    KNEE SURGERY Right 1994       Social History     Socioeconomic History    Marital status:    Tobacco Use    Smoking status: Never     Passive exposure: Past    Smokeless tobacco: Former     Types: Chew     Quit date: 12/24/2003    Tobacco comments:     Stopped chew years ago     Vaping Use    Vaping status: Never Used   Substance and Sexual Activity    Alcohol use: Not Currently    Drug use: Never       Family History   Problem Relation Age of Onset    Heart attack Mother     Heart disease Mother     Coarctation of the aorta Mother     No Known Problems Father     Malig Hyperthermia Neg Hx        Review of Systems   Constitutional: Negative.   Cardiovascular: Negative.    Respiratory: Negative.     Gastrointestinal: Negative.        No Known Allergies      Current Outpatient Medications:     apixaban (Eliquis) 5 MG tablet tablet, TAKE 1 TABLET BY MOUTH TWICE DAILY, Disp: 180 tablet, Rfl: 4    furosemide (LASIX) 40 MG tablet, Take 1 tablet by mouth Daily., Disp: 90 tablet, Rfl: 3    loratadine (CLARITIN) 10 MG tablet, Take 1 tablet by mouth Daily., Disp: , Rfl:     metoprolol succinate XL (TOPROL-XL) 50 MG 24 hr tablet, Take 1 tablet by mouth Daily., Disp: 90 tablet, Rfl: 3    olmesartan (BENICAR) 20 MG tablet, Take 1 tablet by mouth Daily., Disp: 90 tablet, Rfl: 3      Objective:     Vitals:    09/24/24 0838   BP: 100/62   Pulse: 71   Weight: 133 kg (293 lb)   Height: 188 cm (74.02\")     Body mass index is 37.6 kg/m².    PHYSICAL EXAM:    Vitals and nursing note reviewed.   Constitutional:       Appearance: Well-developed.   Eyes:      General:         Right eye: No discharge.         Left eye: No discharge.      Conjunctiva/sclera: Conjunctivae normal.   HENT:      Head: Normocephalic and atraumatic.   Neck:      Vascular: No JVD.   Pulmonary:      Effort: No respiratory distress.   Cardiovascular:      Normal rate.   Edema:     Peripheral edema " absent.   Abdominal:      General: There is no distension.      Tenderness: There is no abdominal tenderness.   Musculoskeletal: Normal range of motion.      Cervical back: Neck supple. Neurological:      Cranial Nerves: No cranial nerve deficit.             ECG 12 Lead    Date/Time: 9/24/2024 9:47 AM  Performed by: Kory Max MD    Authorized by: Kory Max MD  Comparison: compared with previous ECG from 8/2/2024  Similar to previous ECG  Rhythm: sinus rhythm    Clinical impression: normal ECG      I reviewed his echocardiogram.  He has a normal ejection fraction.  No significant valvular heart disease.  Left atrial enlargement.    At times he has been noted to have up to 17% PVCs on Holter monitoring.      Assessment:       Diagnosis Plan   1. Atrial fibrillation, persistent  Case Request EP Lab: Ablation atrial fibrillation             Plan:       We discussed management of his atrial fibrillation.    We discussed the long-term implications of remaining in atrial fibrillation.  If we do not make any attempt now for rhythm control, he will very likely remain in atrial fibrillation for the rest of his life.  We discussed risk of developing heart failure in association with decreased quality of life.    Given his young age, I recommended that we at least attempt rhythm control.  I offered him both ablation and treatment with a class III antiarrhythmic medication.    He preferred ablation due to the desire to not take more medication, and the burden of medication therapy.    We discussed the risk of ablation including the risk of stroke, cardiac injury.  We discussed that this is a tough case of atrial fibrillation and that first time ablation would offer only about a 50% chance of remaining in sinus rhythm, he expressed understanding.    As always, it has been a pleasure to participate in your patient's care.      Sincerely,         Kory Max MD

## 2024-11-08 ENCOUNTER — LAB (OUTPATIENT)
Dept: LAB | Facility: HOSPITAL | Age: 64
End: 2024-11-08
Payer: COMMERCIAL

## 2024-11-08 DIAGNOSIS — Z01.810 PRE-OPERATIVE CARDIOVASCULAR EXAMINATION: ICD-10-CM

## 2024-11-08 DIAGNOSIS — Z13.6 SCREENING FOR ISCHEMIC HEART DISEASE: ICD-10-CM

## 2024-11-08 LAB
ANION GAP SERPL CALCULATED.3IONS-SCNC: 8.1 MMOL/L (ref 5–15)
BUN SERPL-MCNC: 17 MG/DL (ref 8–23)
BUN/CREAT SERPL: 14.7 (ref 7–25)
CALCIUM SPEC-SCNC: 8.8 MG/DL (ref 8.6–10.5)
CHLORIDE SERPL-SCNC: 107 MMOL/L (ref 98–107)
CO2 SERPL-SCNC: 28.9 MMOL/L (ref 22–29)
CREAT SERPL-MCNC: 1.16 MG/DL (ref 0.76–1.27)
DEPRECATED RDW RBC AUTO: 44.5 FL (ref 37–54)
EGFRCR SERPLBLD CKD-EPI 2021: 71.2 ML/MIN/1.73
ERYTHROCYTE [DISTWIDTH] IN BLOOD BY AUTOMATED COUNT: 13 % (ref 12.3–15.4)
GLUCOSE SERPL-MCNC: 110 MG/DL (ref 65–99)
HCT VFR BLD AUTO: 47 % (ref 37.5–51)
HGB BLD-MCNC: 16 G/DL (ref 13–17.7)
MCH RBC QN AUTO: 31.7 PG (ref 26.6–33)
MCHC RBC AUTO-ENTMCNC: 34 G/DL (ref 31.5–35.7)
MCV RBC AUTO: 93.1 FL (ref 79–97)
PLATELET # BLD AUTO: 162 10*3/MM3 (ref 140–450)
PMV BLD AUTO: 8.9 FL (ref 6–12)
POTASSIUM SERPL-SCNC: 4.3 MMOL/L (ref 3.5–5.2)
RBC # BLD AUTO: 5.05 10*6/MM3 (ref 4.14–5.8)
SODIUM SERPL-SCNC: 144 MMOL/L (ref 136–145)
WBC NRBC COR # BLD AUTO: 4.74 10*3/MM3 (ref 3.4–10.8)

## 2024-11-08 PROCEDURE — 36415 COLL VENOUS BLD VENIPUNCTURE: CPT

## 2024-11-08 PROCEDURE — 85027 COMPLETE CBC AUTOMATED: CPT

## 2024-11-08 PROCEDURE — 80048 BASIC METABOLIC PNL TOTAL CA: CPT

## 2024-11-18 ENCOUNTER — HOSPITAL ENCOUNTER (OUTPATIENT)
Facility: HOSPITAL | Age: 64
Setting detail: HOSPITAL OUTPATIENT SURGERY
Discharge: HOME | End: 2024-11-18
Attending: INTERNAL MEDICINE | Admitting: INTERNAL MEDICINE
Payer: COMMERCIAL

## 2024-11-18 ENCOUNTER — ANESTHESIA (OUTPATIENT)
Dept: CARDIOLOGY | Facility: HOSPITAL | Age: 64
End: 2024-11-18
Payer: COMMERCIAL

## 2024-11-18 ENCOUNTER — ANESTHESIA EVENT (OUTPATIENT)
Dept: CARDIOLOGY | Facility: HOSPITAL | Age: 64
End: 2024-11-18
Payer: COMMERCIAL

## 2024-11-18 VITALS
BODY MASS INDEX: 37.22 KG/M2 | WEIGHT: 290 LBS | HEIGHT: 74 IN | RESPIRATION RATE: 20 BRPM | TEMPERATURE: 97.6 F | SYSTOLIC BLOOD PRESSURE: 114 MMHG | DIASTOLIC BLOOD PRESSURE: 77 MMHG | OXYGEN SATURATION: 97 % | HEART RATE: 71 BPM

## 2024-11-18 DIAGNOSIS — I48.19 ATRIAL FIBRILLATION, PERSISTENT: ICD-10-CM

## 2024-11-18 LAB
ACT BLD: 325 SECONDS (ref 82–152)
ACT BLD: 325 SECONDS (ref 82–152)
ACT BLD: 337 SECONDS (ref 82–152)
QT INTERVAL: 373 MS
QT INTERVAL: 401 MS
QTC INTERVAL: 436 MS
QTC INTERVAL: 439 MS

## 2024-11-18 PROCEDURE — 25010000002 ONDANSETRON PER 1 MG: Performed by: NURSE ANESTHETIST, CERTIFIED REGISTERED

## 2024-11-18 PROCEDURE — 25010000002 DEXAMETHASONE PER 1 MG: Performed by: NURSE ANESTHETIST, CERTIFIED REGISTERED

## 2024-11-18 PROCEDURE — 25010000002 MIDAZOLAM PER 1 MG: Performed by: STUDENT IN AN ORGANIZED HEALTH CARE EDUCATION/TRAINING PROGRAM

## 2024-11-18 PROCEDURE — 25010000002 LIDOCAINE 2% SOLUTION: Performed by: NURSE ANESTHETIST, CERTIFIED REGISTERED

## 2024-11-18 PROCEDURE — C1894 INTRO/SHEATH, NON-LASER: HCPCS | Performed by: INTERNAL MEDICINE

## 2024-11-18 PROCEDURE — C1760 CLOSURE DEV, VASC: HCPCS | Performed by: INTERNAL MEDICINE

## 2024-11-18 PROCEDURE — C1769 GUIDE WIRE: HCPCS | Performed by: INTERNAL MEDICINE

## 2024-11-18 PROCEDURE — 93656 COMPRE EP EVAL ABLTJ ATR FIB: CPT | Performed by: INTERNAL MEDICINE

## 2024-11-18 PROCEDURE — 93005 ELECTROCARDIOGRAM TRACING: CPT | Performed by: INTERNAL MEDICINE

## 2024-11-18 PROCEDURE — C1893 INTRO/SHEATH, FIXED,NON-PEEL: HCPCS | Performed by: INTERNAL MEDICINE

## 2024-11-18 PROCEDURE — 25810000003 SODIUM CHLORIDE 0.9 % SOLUTION: Performed by: INTERNAL MEDICINE

## 2024-11-18 PROCEDURE — 25010000002 FENTANYL CITRATE (PF) 50 MCG/ML SOLUTION: Performed by: NURSE ANESTHETIST, CERTIFIED REGISTERED

## 2024-11-18 PROCEDURE — 25010000002 SUGAMMADEX 200 MG/2ML SOLUTION: Performed by: NURSE ANESTHETIST, CERTIFIED REGISTERED

## 2024-11-18 PROCEDURE — 25010000002 PHENYLEPHRINE 10 MG/ML SOLUTION: Performed by: NURSE ANESTHETIST, CERTIFIED REGISTERED

## 2024-11-18 PROCEDURE — 85347 COAGULATION TIME ACTIVATED: CPT

## 2024-11-18 PROCEDURE — C1733 CATH, EP, OTHR THAN COOL-TIP: HCPCS | Performed by: INTERNAL MEDICINE

## 2024-11-18 PROCEDURE — 25010000002 HEPARIN (PORCINE) PER 1000 UNITS: Performed by: INTERNAL MEDICINE

## 2024-11-18 PROCEDURE — C1766 INTRO/SHEATH,STRBLE,NON-PEEL: HCPCS | Performed by: INTERNAL MEDICINE

## 2024-11-18 PROCEDURE — C1732 CATH, EP, DIAG/ABL, 3D/VECT: HCPCS | Performed by: INTERNAL MEDICINE

## 2024-11-18 PROCEDURE — 25010000002 PROTAMINE SULFATE PER 10 MG: Performed by: INTERNAL MEDICINE

## 2024-11-18 PROCEDURE — 25010000002 LIDOCAINE-EPINEPHRINE (PF) 1 %-1:200000 SOLUTION: Performed by: INTERNAL MEDICINE

## 2024-11-18 PROCEDURE — C1759 CATH, INTRA ECHOCARDIOGRAPHY: HCPCS | Performed by: INTERNAL MEDICINE

## 2024-11-18 PROCEDURE — 25010000002 GLYCOPYRROLATE 0.2 MG/ML SOLUTION: Performed by: NURSE ANESTHETIST, CERTIFIED REGISTERED

## 2024-11-18 PROCEDURE — S0260 H&P FOR SURGERY: HCPCS | Performed by: INTERNAL MEDICINE

## 2024-11-18 PROCEDURE — 25010000002 PROPOFOL 10 MG/ML EMULSION: Performed by: NURSE ANESTHETIST, CERTIFIED REGISTERED

## 2024-11-18 RX ORDER — SODIUM CHLORIDE 9 MG/ML
50 INJECTION, SOLUTION INTRAVENOUS CONTINUOUS
Status: DISCONTINUED | OUTPATIENT
Start: 2024-11-18 | End: 2024-11-18 | Stop reason: HOSPADM

## 2024-11-18 RX ORDER — EPHEDRINE SULFATE 50 MG/ML
5 INJECTION, SOLUTION INTRAVENOUS ONCE AS NEEDED
Status: DISCONTINUED | OUTPATIENT
Start: 2024-11-18 | End: 2024-11-18 | Stop reason: HOSPADM

## 2024-11-18 RX ORDER — ROCURONIUM BROMIDE 10 MG/ML
INJECTION, SOLUTION INTRAVENOUS AS NEEDED
Status: DISCONTINUED | OUTPATIENT
Start: 2024-11-18 | End: 2024-11-18 | Stop reason: SURG

## 2024-11-18 RX ORDER — FENTANYL CITRATE 50 UG/ML
50 INJECTION, SOLUTION INTRAMUSCULAR; INTRAVENOUS
Status: DISCONTINUED | OUTPATIENT
Start: 2024-11-18 | End: 2024-11-18 | Stop reason: HOSPADM

## 2024-11-18 RX ORDER — GLYCOPYRROLATE 0.2 MG/ML
INJECTION INTRAMUSCULAR; INTRAVENOUS AS NEEDED
Status: DISCONTINUED | OUTPATIENT
Start: 2024-11-18 | End: 2024-11-18 | Stop reason: SURG

## 2024-11-18 RX ORDER — SODIUM CHLORIDE 0.9 % (FLUSH) 0.9 %
3 SYRINGE (ML) INJECTION EVERY 12 HOURS SCHEDULED
Status: DISCONTINUED | OUTPATIENT
Start: 2024-11-18 | End: 2024-11-18 | Stop reason: HOSPADM

## 2024-11-18 RX ORDER — LABETALOL HYDROCHLORIDE 5 MG/ML
5 INJECTION, SOLUTION INTRAVENOUS
Status: DISCONTINUED | OUTPATIENT
Start: 2024-11-18 | End: 2024-11-18 | Stop reason: HOSPADM

## 2024-11-18 RX ORDER — PROMETHAZINE HYDROCHLORIDE 12.5 MG/1
25 TABLET ORAL ONCE AS NEEDED
Status: DISCONTINUED | OUTPATIENT
Start: 2024-11-18 | End: 2024-11-18 | Stop reason: HOSPADM

## 2024-11-18 RX ORDER — PROMETHAZINE HYDROCHLORIDE 25 MG/1
25 SUPPOSITORY RECTAL ONCE AS NEEDED
Status: DISCONTINUED | OUTPATIENT
Start: 2024-11-18 | End: 2024-11-18 | Stop reason: HOSPADM

## 2024-11-18 RX ORDER — SODIUM CHLORIDE 0.9 % (FLUSH) 0.9 %
10 SYRINGE (ML) INJECTION AS NEEDED
Status: DISCONTINUED | OUTPATIENT
Start: 2024-11-18 | End: 2024-11-18 | Stop reason: HOSPADM

## 2024-11-18 RX ORDER — DROPERIDOL 2.5 MG/ML
0.62 INJECTION, SOLUTION INTRAMUSCULAR; INTRAVENOUS
Status: DISCONTINUED | OUTPATIENT
Start: 2024-11-18 | End: 2024-11-18 | Stop reason: HOSPADM

## 2024-11-18 RX ORDER — DIPHENHYDRAMINE HYDROCHLORIDE 50 MG/ML
12.5 INJECTION INTRAMUSCULAR; INTRAVENOUS
Status: DISCONTINUED | OUTPATIENT
Start: 2024-11-18 | End: 2024-11-18 | Stop reason: HOSPADM

## 2024-11-18 RX ORDER — PHENYLEPHRINE HYDROCHLORIDE 10 MG/ML
INJECTION INTRAVENOUS AS NEEDED
Status: DISCONTINUED | OUTPATIENT
Start: 2024-11-18 | End: 2024-11-18 | Stop reason: SURG

## 2024-11-18 RX ORDER — IPRATROPIUM BROMIDE AND ALBUTEROL SULFATE 2.5; .5 MG/3ML; MG/3ML
3 SOLUTION RESPIRATORY (INHALATION) ONCE AS NEEDED
Status: DISCONTINUED | OUTPATIENT
Start: 2024-11-18 | End: 2024-11-18 | Stop reason: HOSPADM

## 2024-11-18 RX ORDER — SODIUM CHLORIDE 0.9 % (FLUSH) 0.9 %
3-10 SYRINGE (ML) INJECTION AS NEEDED
Status: DISCONTINUED | OUTPATIENT
Start: 2024-11-18 | End: 2024-11-18 | Stop reason: HOSPADM

## 2024-11-18 RX ORDER — PROTAMINE SULFATE 10 MG/ML
INJECTION, SOLUTION INTRAVENOUS
Status: DISCONTINUED | OUTPATIENT
Start: 2024-11-18 | End: 2024-11-18 | Stop reason: HOSPADM

## 2024-11-18 RX ORDER — MIDAZOLAM HYDROCHLORIDE 1 MG/ML
1 INJECTION, SOLUTION INTRAMUSCULAR; INTRAVENOUS
Status: DISCONTINUED | OUTPATIENT
Start: 2024-11-18 | End: 2024-11-18 | Stop reason: HOSPADM

## 2024-11-18 RX ORDER — DEXAMETHASONE SODIUM PHOSPHATE 4 MG/ML
INJECTION, SOLUTION INTRA-ARTICULAR; INTRALESIONAL; INTRAMUSCULAR; INTRAVENOUS; SOFT TISSUE AS NEEDED
Status: DISCONTINUED | OUTPATIENT
Start: 2024-11-18 | End: 2024-11-18 | Stop reason: SURG

## 2024-11-18 RX ORDER — FLUMAZENIL 0.1 MG/ML
0.2 INJECTION INTRAVENOUS AS NEEDED
Status: DISCONTINUED | OUTPATIENT
Start: 2024-11-18 | End: 2024-11-18 | Stop reason: HOSPADM

## 2024-11-18 RX ORDER — HEPARIN SODIUM 1000 [USP'U]/ML
INJECTION, SOLUTION INTRAVENOUS; SUBCUTANEOUS
Status: DISCONTINUED | OUTPATIENT
Start: 2024-11-18 | End: 2024-11-18 | Stop reason: HOSPADM

## 2024-11-18 RX ORDER — SODIUM CHLORIDE, SODIUM LACTATE, POTASSIUM CHLORIDE, CALCIUM CHLORIDE 600; 310; 30; 20 MG/100ML; MG/100ML; MG/100ML; MG/100ML
9 INJECTION, SOLUTION INTRAVENOUS CONTINUOUS
Status: DISCONTINUED | OUTPATIENT
Start: 2024-11-18 | End: 2024-11-18 | Stop reason: HOSPADM

## 2024-11-18 RX ORDER — ONDANSETRON 2 MG/ML
INJECTION INTRAMUSCULAR; INTRAVENOUS AS NEEDED
Status: DISCONTINUED | OUTPATIENT
Start: 2024-11-18 | End: 2024-11-18 | Stop reason: SURG

## 2024-11-18 RX ORDER — NALOXONE HCL 0.4 MG/ML
0.2 VIAL (ML) INJECTION AS NEEDED
Status: DISCONTINUED | OUTPATIENT
Start: 2024-11-18 | End: 2024-11-18 | Stop reason: HOSPADM

## 2024-11-18 RX ORDER — ONDANSETRON 2 MG/ML
4 INJECTION INTRAMUSCULAR; INTRAVENOUS ONCE AS NEEDED
Status: DISCONTINUED | OUTPATIENT
Start: 2024-11-18 | End: 2024-11-18 | Stop reason: HOSPADM

## 2024-11-18 RX ORDER — LIDOCAINE HYDROCHLORIDE 10 MG/ML
0.5 INJECTION, SOLUTION INFILTRATION; PERINEURAL ONCE AS NEEDED
Status: DISCONTINUED | OUTPATIENT
Start: 2024-11-18 | End: 2024-11-18 | Stop reason: HOSPADM

## 2024-11-18 RX ORDER — PROPOFOL 10 MG/ML
VIAL (ML) INTRAVENOUS AS NEEDED
Status: DISCONTINUED | OUTPATIENT
Start: 2024-11-18 | End: 2024-11-18 | Stop reason: SURG

## 2024-11-18 RX ORDER — LIDOCAINE HYDROCHLORIDE 20 MG/ML
INJECTION, SOLUTION INFILTRATION; PERINEURAL AS NEEDED
Status: DISCONTINUED | OUTPATIENT
Start: 2024-11-18 | End: 2024-11-18 | Stop reason: SURG

## 2024-11-18 RX ORDER — FAMOTIDINE 10 MG/ML
20 INJECTION, SOLUTION INTRAVENOUS ONCE
Status: COMPLETED | OUTPATIENT
Start: 2024-11-18 | End: 2024-11-18

## 2024-11-18 RX ORDER — FENTANYL CITRATE 50 UG/ML
50 INJECTION, SOLUTION INTRAMUSCULAR; INTRAVENOUS ONCE AS NEEDED
Status: DISCONTINUED | OUTPATIENT
Start: 2024-11-18 | End: 2024-11-18 | Stop reason: HOSPADM

## 2024-11-18 RX ORDER — HYDROMORPHONE HYDROCHLORIDE 1 MG/ML
0.5 INJECTION, SOLUTION INTRAMUSCULAR; INTRAVENOUS; SUBCUTANEOUS
Status: DISCONTINUED | OUTPATIENT
Start: 2024-11-18 | End: 2024-11-18 | Stop reason: HOSPADM

## 2024-11-18 RX ORDER — SODIUM CHLORIDE 0.9 % (FLUSH) 0.9 %
10 SYRINGE (ML) INJECTION EVERY 12 HOURS SCHEDULED
Status: DISCONTINUED | OUTPATIENT
Start: 2024-11-18 | End: 2024-11-18 | Stop reason: HOSPADM

## 2024-11-18 RX ORDER — HYDRALAZINE HYDROCHLORIDE 20 MG/ML
5 INJECTION INTRAMUSCULAR; INTRAVENOUS
Status: DISCONTINUED | OUTPATIENT
Start: 2024-11-18 | End: 2024-11-18 | Stop reason: HOSPADM

## 2024-11-18 RX ADMIN — PROPOFOL 200 MG: 10 INJECTION, EMULSION INTRAVENOUS at 10:35

## 2024-11-18 RX ADMIN — PHENYLEPHRINE HYDROCHLORIDE 100 MCG: 10 INJECTION INTRAVENOUS at 11:04

## 2024-11-18 RX ADMIN — FAMOTIDINE 20 MG: 10 INJECTION INTRAVENOUS at 09:52

## 2024-11-18 RX ADMIN — FENTANYL CITRATE 50 MCG: 50 INJECTION, SOLUTION INTRAMUSCULAR; INTRAVENOUS at 10:35

## 2024-11-18 RX ADMIN — PHENYLEPHRINE HYDROCHLORIDE 100 MCG: 10 INJECTION INTRAVENOUS at 11:18

## 2024-11-18 RX ADMIN — PHENYLEPHRINE HYDROCHLORIDE 100 MCG: 10 INJECTION INTRAVENOUS at 11:01

## 2024-11-18 RX ADMIN — PHENYLEPHRINE HYDROCHLORIDE 100 MCG: 10 INJECTION INTRAVENOUS at 11:13

## 2024-11-18 RX ADMIN — GLYCOPYRROLATE 0.2 MG: 0.2 INJECTION INTRAMUSCULAR; INTRAVENOUS at 10:56

## 2024-11-18 RX ADMIN — SODIUM CHLORIDE 50 ML/HR: 9 INJECTION, SOLUTION INTRAVENOUS at 09:05

## 2024-11-18 RX ADMIN — PHENYLEPHRINE HYDROCHLORIDE 100 MCG: 10 INJECTION INTRAVENOUS at 10:55

## 2024-11-18 RX ADMIN — PHENYLEPHRINE HYDROCHLORIDE 50 MCG: 10 INJECTION INTRAVENOUS at 10:45

## 2024-11-18 RX ADMIN — DEXAMETHASONE SODIUM PHOSPHATE 8 MG: 4 INJECTION, SOLUTION INTRA-ARTICULAR; INTRALESIONAL; INTRAMUSCULAR; INTRAVENOUS; SOFT TISSUE at 10:44

## 2024-11-18 RX ADMIN — ROCURONIUM BROMIDE 20 MG: 10 INJECTION, SOLUTION INTRAVENOUS at 11:18

## 2024-11-18 RX ADMIN — PHENYLEPHRINE HYDROCHLORIDE 150 MCG: 10 INJECTION INTRAVENOUS at 11:41

## 2024-11-18 RX ADMIN — PHENYLEPHRINE HYDROCHLORIDE 100 MCG: 10 INJECTION INTRAVENOUS at 11:59

## 2024-11-18 RX ADMIN — ROCURONIUM BROMIDE 50 MG: 10 INJECTION, SOLUTION INTRAVENOUS at 10:35

## 2024-11-18 RX ADMIN — GLYCOPYRROLATE 0.2 MG: 0.2 INJECTION INTRAMUSCULAR; INTRAVENOUS at 11:40

## 2024-11-18 RX ADMIN — SUGAMMADEX 200 MG: 100 INJECTION, SOLUTION INTRAVENOUS at 12:07

## 2024-11-18 RX ADMIN — LIDOCAINE HYDROCHLORIDE 50 MG: 20 INJECTION, SOLUTION INFILTRATION; PERINEURAL at 10:35

## 2024-11-18 RX ADMIN — PHENYLEPHRINE HYDROCHLORIDE 100 MCG: 10 INJECTION INTRAVENOUS at 10:48

## 2024-11-18 RX ADMIN — ONDANSETRON 4 MG: 2 INJECTION INTRAMUSCULAR; INTRAVENOUS at 10:44

## 2024-11-18 RX ADMIN — MIDAZOLAM 1 MG: 1 INJECTION INTRAMUSCULAR; INTRAVENOUS at 10:18

## 2024-11-18 RX ADMIN — PHENYLEPHRINE HYDROCHLORIDE 100 MCG: 10 INJECTION INTRAVENOUS at 11:34

## 2024-11-18 NOTE — ANESTHESIA PROCEDURE NOTES
Airway  Urgency: elective    Date/Time: 11/18/2024 10:39 AM  Airway not difficult    General Information and Staff    Patient location during procedure: OR  Anesthesiologist: Nuno Angel MD  CRNA/CAA: Yaquelin Rojas CRNA    Indications and Patient Condition  Indications for airway management: airway protection    Preoxygenated: yes  MILS maintained throughout  Mask difficulty assessment: 2 - vent by mask + OA or adjuvant +/- NMBA    Final Airway Details  Final airway type: endotracheal airway      Successful airway: ETT  Cuffed: yes   Successful intubation technique: direct laryngoscopy  Facilitating devices/methods: intubating stylet  Endotracheal tube insertion site: oral  Blade: Stacey  Blade size: 4  ETT size (mm): 7.5  Cormack-Lehane Classification: grade IIb - view of arytenoids or posterior of glottis only  Placement verified by: chest auscultation, capnometry and palpation of cuff   Measured from: lips  ETT/EBT  to lips (cm): 23  Number of attempts at approach: 1  Assessment: lips, teeth, and gum same as pre-op and atraumatic intubation

## 2024-11-18 NOTE — ANESTHESIA POSTPROCEDURE EVALUATION
"Patient: Nghia Jimenez    Procedure Summary       Date: 11/18/24 Room / Location: [facility] 6 /  [facility] C[facility] INVASIVE LOCATION    Anesthesia Start: 1023 Anesthesia Stop: 1232    Procedures:       Ablation atrial fibrillation PFA      3D Mapping EP Diagnosis:       Atrial fibrillation, persistent      (atrial fibrillation)    Providers: Kory Max MD Provider: Nuno Angel MD    Anesthesia Type: general ASA Status: 3            Anesthesia Type: general    Vitals  Vitals Value Taken Time   /77 11/18/24 1415   Temp     Pulse 69 11/18/24 1417   Resp 20 11/18/24 1240   SpO2 95 % 11/18/24 1417   Vitals shown include unfiled device data.        Post Anesthesia Care and Evaluation    Patient location during evaluation: PACU  Patient participation: complete - patient participated  Level of consciousness: awake and alert  Pain management: adequate    Airway patency: patent  Anesthetic complications: No anesthetic complications  PONV Status: controlled  Cardiovascular status: acceptable and hemodynamically stable  Respiratory status: acceptable  Hydration status: acceptable    Comments: /77   Pulse 71   Temp 36.4 °C (97.6 °F) (Temporal)   Resp 20   Ht 188 cm (74\")   Wt 132 kg (290 lb)   SpO2 97%   BMI 37.23 kg/m²     "

## 2024-11-18 NOTE — Clinical Note
Hemostasis started on the right femoral vein. Perclose was used in achieving hemostasis. Closure device deployed in the vessel and manual pressure applied to vessel. Manual pressure was held by C Miley RTR. Manual pressure was held for 10 min. Hemostasis achieved successfully. Closure device additional comment: Perclose #1 pressure held, gauze and tegaderm

## 2024-11-18 NOTE — ANESTHESIA PREPROCEDURE EVALUATION
Anesthesia Evaluation     Patient summary reviewed and Nursing notes reviewed                Airway   Mallampati: III  TM distance: >3 FB  Neck ROM: full  Dental - normal exam     Pulmonary    (+) ,sleep apnea  Cardiovascular     ECG reviewed    (+) hypertension, dysrhythmias Atrial Fib    ROS comment:   ·  Left ventricular systolic function is normal. Calculated left ventricular EF = 55.8%  ·  Left ventricular diastolic function was normal.      Neuro/Psych  GI/Hepatic/Renal/Endo    (+) obesity    Musculoskeletal     Abdominal    Substance History      OB/GYN          Other                          Anesthesia Plan    ASA 3     general     (I have reviewed the patient's history with the patient and the chart, including all pertinent laboratory results and imaging. I have explained the risks of anesthesia including but not limited to dental damage, corneal abrasion, nerve injury, MI, stroke, and death. Questions asked and answered. Anesthetic plan discussed with patient and team as indicated. Patient expressed understanding of the above.  )  intravenous induction     Anesthetic plan, risks, benefits, and alternatives have been provided, discussed and informed consent has been obtained with: patient.        CODE STATUS:

## 2024-11-18 NOTE — Clinical Note
Hemostasis started on the right femoral vein. Perclose was used in achieving hemostasis. Closure device deployed in the vessel and manual pressure applied to vessel. Manual pressure was held by C Miley RTR. Manual pressure was held for 10 min. Hemostasis achieved successfully. Closure device additional comment: Perclose #2, pressure held, gauze and tegaderm

## 2024-11-18 NOTE — Clinical Note
The DP pulses are detected w/ doppler bilaterally. The PT pulses are detected w/ doppler bilaterally. MERRY heels dry, intact, and elevated; cocoon warmer on at 46°C.

## 2024-11-18 NOTE — Clinical Note
Replaced previous sheath in the right femoral vein. Short 8fr out, versacross sheath and baylis wire in

## 2024-11-18 NOTE — H&P
[facility]   HISTORY AND PHYSICAL    Patient Name:Nghia Jimenez  : 1960  MRN: [id number]  Primary Care Physician: Donis Barragan MD  Date of admission: 2024    Subjective   Subjective     Chief Complaint:   Atrial Fibrillation    History of Present Illness    Nghia Jimenez is a 63 y.o. male who presents today for persistent atrial fibrillation.     Mr. Jimenez had noticed increasing shortness of breath for several months.  He presented to cardiology clinic last November and was found to be in atrial fibrillation, and also felt to have some heart failure.  He was treated with diuretics and cardioversion.  He reports an improvement in his symptoms.     He generally done okay this year, he was in sinus rhythm in January.  He has felt more fatigued.  But not noticed any tachycardia or irregular heartbeat.  When he returned to cardiology clinic in August for routine follow-up he was back in atrial fibrillation.     We are unsure the duration of his atrial fibrillation, he had never been told about it before November.  On echocardiogram his left atrium was severely dilated.     He has not been hospitalized.  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   Eyes: Negative.    Respiratory:  Negative for chest tightness and shortness of breath.    Cardiovascular:  Negative for chest pain, palpitations and leg swelling.   Gastrointestinal: Negative.    Endocrine: Negative.    Genitourinary: Negative.    Musculoskeletal: Negative.    Skin: Negative.    Neurological:  Negative for dizziness and syncope.   Hematological: Negative.    Psychiatric/Behavioral: Negative.           Personal History     Past Medical History:   Diagnosis Date    Atrial fibrillation     Hypertension     Hypoxemia associated with sleep     LYLE (obstructive sleep apnea) 2024    Home sleep study.  Weight 296  pounds.  Severe LYLE with AHI 42.2 events per hour.  Low oxygen saturation 74% and sleep-related hypoxia present for 36.5 minutes.  The patient snored 60.4% total monitoring time.       Past Surgical History:   Procedure Laterality Date    BASAL CELL CARCINOMA EXCISION  2016    CARDIOVERSION      COLONOSCOPY N/A 07/02/2024    Procedure: COLONOSCOPY TO CECUM  AND TERM. ILEUM;  Surgeon: Jeferson Jimenes MD;  Location: [facility] ENDOSCOPY;  Service: Gastroenterology;  Laterality: N/A;  PRE OP - SCREENING  POST OP - DIVERTICULOSIS, INT HEMORRHOIDS    GALLBLADDER SURGERY  2022    KIDNEY STONE SURGERY  1995    lithotripsy    KNEE SURGERY Right 1994    arthroscopy - cartilage repair       Family History: His family history includes Coarctation of the aorta in his mother; Heart attack in his mother; Heart disease in his mother; No Known Problems in his father.     Social History: He  reports that he has never smoked. He has been exposed to tobacco smoke. He quit smokeless tobacco use about 20 years ago.  His smokeless tobacco use included chew. He reports that he does not currently use alcohol. He reports that he does not use drugs.    Home Medications:  apixaban, furosemide, loratadine, metoprolol succinate XL, and olmesartan    Allergies:  He has No Known Allergies.    Objective    Objective     Vitals:    Temp:  [97.6 °F (36.4 °C)] 97.6 °F (36.4 °C)  Heart Rate:  [64] 64  Resp:  [20] 20  BP: (142)/(94) 142/94    Physical Exam  Vitals and nursing note reviewed.   Constitutional:       General: He is not in acute distress.     Appearance: He is not diaphoretic.   HENT:      Mouth/Throat:      Pharynx: No oropharyngeal exudate.   Eyes:      General: No scleral icterus.  Neck:      Trachea: No tracheal deviation.   Cardiovascular:      Rate and Rhythm: Normal rate and regular rhythm.   Pulmonary:      Effort: Pulmonary effort is normal. No respiratory distress.      Breath sounds: Normal breath sounds.   Abdominal:      General:  There is no distension.      Palpations: Abdomen is soft.   Skin:     General: Skin is warm and dry.   Neurological:      Mental Status: He is alert and oriented to person, place, and time.   Psychiatric:         Behavior: Behavior normal.         Thought Content: Thought content normal.         Judgment: Judgment normal.          Result Review    Result Review:  I have personally reviewed the results from the time of this admission to 11/18/2024 09:49 EST and agree with these findings:  []  Laboratory list / accordion  []  Microbiology  []  Radiology  []  EKG/Telemetry   []  Cardiology/Vascular   []  Pathology  []  Old records  []  Other:  Most notable findings include: sinus rhythm      Assessment & Plan   Assessment / Plan     Brief Patient Summary:  Nghia Jimenez is a 63 y.o. male with paroxysmal atrial fibrillation    Active Hospital Problems:  Active Hospital Problems    Diagnosis     **Atrial fibrillation, persistent      Plan:   Proceed with ablation    Kory Max MD

## 2024-11-18 NOTE — DISCHARGE INSTRUCTIONS
[facility]  [address] [place] [address]    Dr. Max's Discharge Instructions for Atrial Fibrillation or Atrial Flutter     Refer to this sheet in the next few weeks. These instructions provide you with information on caring for yourself after your procedure.     Make arrangements to have someone stay with you for 24 hours following the procedure.  This is due to the sedation you received and for your safety in the event of any complications.      About your Procedure:  You have had a procedure called a catheter ablation.  It is used to treat abnormal heartbeats (arrhythmia). The procedure destroyed (ablated) the cells in your heart that were causing your heart rhythm problem. During the procedure, the healthcare provider placed a thin, flexible wire (catheter) into a blood vessel in your groin.  The provider then threaded the catheter to your heart and destroyed the problematic cells.      Goal of Procedure:  The goal of this procedure is to regain a normal heart rhythm (sinus rhythm) and reduce the symptoms associated with your irregular heart rhythm. In many cases, one ablation is enough to treat the arrythmia, but sometimes the problem returns, and a second ablation may be necessary.      What to Expect After the Procedure:  After your procedure, it is typical to have the following sensations:  Minor discomfort or soreness in the chest or a small bump at the insertion site (groin). The bump should usually decrease in size and tenderness within 1 to 2 weeks.  Mild bruising which will usually fade within 2 to 4 weeks.  A mild sore throat  Sometimes the irregular heartbeat goes away immediately after the procedure.  Other times, it may take longer.  As your heart heals, it is common to have some Atrial Fibrillation symptoms and you may even go into atrial fibrillation up to a few months after the procedure.    Do not miss a dose of your blood thinner (Apixaban/Eliquis,  rivaroxaban/Xarelto, Warfarin/Coumadin).   You will have a follow up appointment in approximately 1 month.      Home Care Instructions:  Take your medications exactly as directed.  Do not skip doses unless directed to do so by your healthcare provider.   You should be able to return to most of your normal activities in the next 1-2 days. These include walking, climbing stairs, and doing household chores.   You may remove the dressings in your groin in 24 hours.    You may then shower and gently wash the area with plain soap and water after removing the dressings.     Do not apply powder or lotion to the site.  Do not take baths, swim, or use a hot tub until your health care provider approves and the site is completely healed.  Hold direct pressure to your groin sites any time you laugh, cough, sneeze or change positions.  Do this for the next 48 hours.   Do not bend, squat, or lift anything over 20 lb. (9 kg) for 7 days after your ablation. Do not do any other heavy physical activity for 7 days.  This allows your body time to heal properly.    Inspect the groin sites daily for signs of infection for 7 days. Those signs include: redness, swelling, drainage, or warmth at the groin sites.     Follow instructions about when you can drive or return to work as directed by your physician.    If you experience bleeding or swelling (larger than the size of a golf ball) at the groin sites after you go home, do not remove the dressing. Lie down flat and hold pressure over the sites for at last 10-15 minutes. You or the person with you should call the office at [phone] for further instructions. If the bleeding does not stop after 10-15 minutes, call [phone] and continue holding pressure.  You need to come to the emergency room for evaluation.     Call Your Doctor If:  You experience the symptoms you had before the procedure for more than 24 hours. You have drainage (other than a small amount of blood on the dressing).  You  have chills or a fever > 101.  You have redness, warmth, swelling (larger than a walnut), drainage or severe pain at the insertion site  You develop chest pain or shortness of breath, feel faint, or pass out.  You develop pain, discoloration, coldness, numbness, tingling, or severe bruising in the leg that held the catheter.  You develop bleeding from any other place, such as the bowels.  You have difficulty swallowing, excessive pain when swallowing, difficulty breathing or vomiting of blood  You have unstable vital signs such as blood pressure less than 90/60 or a heart rate greater than 130 beats per minute for more than 1 hour.   You have any symptoms of a stroke.  Remember BE FAST  B-balance. Sudden trouble walking or loss of balance.  E-eyes.  Sudden changes in how you see or a sudden onset of a very bad headache.   F-face. Sudden weakness or loss of feeling of the face or facial droop on one side.   A-arms Sudden weakness or numbness in one arm.  One arm drifts down if they are both held out in front of you. This happens suddenly and usually on one side of the body.  S-speech.  Sudden trouble speaking, slurred speech or trouble understanding what people are saying.   T-time  Time to call emergency services.  Write down the symptoms and the time they started.

## 2024-11-20 ENCOUNTER — TELEPHONE (OUTPATIENT)
Age: 64
End: 2024-11-20
Payer: COMMERCIAL

## 2024-11-20 NOTE — TELEPHONE ENCOUNTER
Patient is scheduled, I informed him of the message below; patient stated he had just taken his BP and it was good and that he has not had any more abnormal heart beats. Patient verbalized understanding and will reach out if he has any issues prior to appointment.

## 2024-11-20 NOTE — TELEPHONE ENCOUNTER
"Please call patient to schedule 1 month f/u from ablation on 11/18.    Also, please let him know that b/p and heart rate are good.  It is ok/normal for him to have some irregular heart beats for approximately 90 days after ablation. This is called the \"blanking period.\"    He just needs to stay on blood thinner, don't miss any doses, and let us know if heart rate gets up over 100 for 45 mins or more.    Otherwise, we will see him in the office for f/u.  "

## 2025-01-02 ENCOUNTER — OFFICE VISIT (OUTPATIENT)
Age: 65
End: 2025-01-02
Payer: COMMERCIAL

## 2025-01-02 VITALS
HEIGHT: 74 IN | HEART RATE: 75 BPM | WEIGHT: 295 LBS | BODY MASS INDEX: 37.86 KG/M2 | DIASTOLIC BLOOD PRESSURE: 60 MMHG | SYSTOLIC BLOOD PRESSURE: 98 MMHG

## 2025-01-02 DIAGNOSIS — I48.19 ATRIAL FIBRILLATION, PERSISTENT: Primary | ICD-10-CM

## 2025-01-02 NOTE — PROGRESS NOTES
Date of Office Visit: 2025  Encounter Provider: TIERRA Collado  Place of Service: [facility] CARDIOLOGY  Patient Name: Nghia Jimenez  :1960    Chief Complaint   Patient presents with    persistent AFIB    s/p ablation    :     HPI: Nghia Jimenez is a 64 y.o. male who follows with Dr. Muro, referred to Dr. Max for persistent atrial fibrillation.     In 2023 he was found to be in AF during routine cardiology visit. He was treated with diuretics and CV.     In 2024 he was back in AF. No severe symptoms but with history of HF symptoms related to AF rhythm control was recommended.     He saw Dr. Max in September and elected to proceed with ablation.    2024.  He underwent pulmonary vein isolation with PFA with Dr. Max.                     He presents today for follow-up appointment.    Since ablation.  He has been doing well.    Says he did not have any significant symptoms related to his atrial fibrillation.  Says he has not noticed a huge difference.    Only complaint was some minor fatigue but his reason for ablation was to prevent future episodes of atrial fibrillation and progression of A-fib.    His EKG shows normal sinus rhythm.    He is on low-dose metoprolol for rate control.    Apixaban for AC.    Past Medical History:   Diagnosis Date    Atrial fibrillation     Hypertension     Hypoxemia associated with sleep     LYLE (obstructive sleep apnea) 2024    Home sleep study.  Weight 296 pounds.  Severe LYLE with AHI 42.2 events per hour.  Low oxygen saturation 74% and sleep-related hypoxia present for 36.5 minutes.  The patient snored 60.4% total monitoring time.       Past Surgical History:   Procedure Laterality Date    BASAL CELL CARCINOMA EXCISION  2016    CARDIAC ELECTROPHYSIOLOGY PROCEDURE N/A 2024    Procedure: Ablation atrial fibrillation PFA;  Surgeon: Kory Max MD;  Location: [facility]  LOCATION;  Service: Cardiovascular;  Laterality: N/A;  name alert    CARDIOVERSION      COLONOSCOPY N/A 07/02/2024    Procedure: COLONOSCOPY TO CECUM  AND TERM. ILEUM;  Surgeon: Jeferson Jimenes MD;  Location: [facility] ENDOSCOPY;  Service: Gastroenterology;  Laterality: N/A;  PRE OP - SCREENING  POST OP - DIVERTICULOSIS, INT HEMORRHOIDS    GALLBLADDER SURGERY  2022    KIDNEY STONE SURGERY  1995    lithotripsy    KNEE SURGERY Right 1994    arthroscopy - cartilage repair       Social History     Socioeconomic History    Marital status:    Tobacco Use    Smoking status: Never     Passive exposure: Past    Smokeless tobacco: Former     Types: Chew     Quit date: 12/24/2003    Tobacco comments:     Stopped chew years ago     Vaping Use    Vaping status: Never Used   Substance and Sexual Activity    Alcohol use: Not Currently    Drug use: Never       Family History   Problem Relation Age of Onset    Heart attack Mother     Heart disease Mother     Coarctation of the aorta Mother     No Known Problems Father     Malig Hyperthermia Neg Hx        Review of Systems   Constitutional: Negative for chills, fever and malaise/fatigue.   Cardiovascular:  Negative for chest pain, dyspnea on exertion, leg swelling, near-syncope, orthopnea, palpitations, paroxysmal nocturnal dyspnea and syncope.   Respiratory:  Negative for cough and shortness of breath.    Hematologic/Lymphatic: Negative.    Musculoskeletal:  Negative for joint pain, joint swelling and myalgias.   Gastrointestinal:  Negative for abdominal pain, diarrhea, melena, nausea and vomiting.   Genitourinary:  Negative for frequency and hematuria.   Neurological:  Negative for light-headedness, numbness, paresthesias and seizures.   Allergic/Immunologic: Negative.    All other systems reviewed and are negative.      No Known Allergies      Current Outpatient Medications:     apixaban (Eliquis) 5 MG tablet tablet, TAKE 1 TABLET BY MOUTH TWICE DAILY, Disp: 180 tablet, Rfl:  "4    furosemide (LASIX) 40 MG tablet, Take 1 tablet by mouth Daily., Disp: 90 tablet, Rfl: 3    loratadine (CLARITIN) 10 MG tablet, Take 1 tablet by mouth Every Night., Disp: , Rfl:     metoprolol succinate XL (TOPROL-XL) 50 MG 24 hr tablet, Take 1 tablet by mouth Daily. (Patient taking differently: Take 1 tablet by mouth Daily. Evening), Disp: 90 tablet, Rfl: 3    olmesartan (BENICAR) 20 MG tablet, Take 1 tablet by mouth Daily., Disp: 90 tablet, Rfl: 3      Objective:     Vitals:    01/02/25 0942   BP: 98/60   BP Location: Right arm   Patient Position: Sitting   Pulse: 75   Weight: 134 kg (295 lb)   Height: 188 cm (74\")     Body mass index is 37.88 kg/m².    PHYSICAL EXAM:    Vitals Reviewed.   General Appearance: No acute distress, well developed and well nourished.   Eyes: Conjunctiva and lids: No erythema, swelling, or discharge. Sclera non-icteric.   HENT: Atraumatic, normocephalic. External eyes, ears, and nose normal.   Respiratory: No signs of respiratory distress. Respiration rhythm and depth normal.   Clear to auscultation. No rales, crackles, rhonchi, or wheezing auscultated.   Cardiovascular:  Heart Rate and Rhythm: Normal, Heart Sounds: Normal S1 and S2. No S3 or S4 noted.  Gastrointestinal:  Abdomen soft, non-distended, non-tender.   Musculoskeletal: Normal movement of extremities  Skin: Warm and dry.   Psychiatric: Patient alert and oriented to person, place, and time. Speech and behavior appropriate. Normal mood and affect.       ECG 12 Lead    Date/Time: 1/2/2025 11:00 AM  Performed by: Marty Watson APRN    Authorized by: Marty Watson APRN  Comparison: compared with previous ECG   Similar to previous ECG  Rhythm: sinus rhythm            Assessment:       Diagnosis Plan   1. Atrial fibrillation, persistent               Plan:   Persistent atrial fibrillation---s/p PVI (2024)----- no recurent AF since ablation. He feels good. No sigfnicant symptoms in AF, trying to prevent progression of AF. He " is maintaining NSR. Continue metoprolol and apixaban.         Follow up in 3 months.             As always, it has been a pleasure to participate in your patient's care.      Sincerely,         TIERRA Collado

## 2025-04-10 ENCOUNTER — OFFICE VISIT (OUTPATIENT)
Age: 65
End: 2025-04-10
Payer: COMMERCIAL

## 2025-04-10 VITALS
BODY MASS INDEX: 37.99 KG/M2 | DIASTOLIC BLOOD PRESSURE: 80 MMHG | HEIGHT: 74 IN | HEART RATE: 63 BPM | WEIGHT: 296 LBS | SYSTOLIC BLOOD PRESSURE: 118 MMHG

## 2025-04-10 DIAGNOSIS — I48.19 ATRIAL FIBRILLATION, PERSISTENT: Primary | ICD-10-CM

## 2025-04-10 PROCEDURE — 93000 ELECTROCARDIOGRAM COMPLETE: CPT

## 2025-04-10 PROCEDURE — 99214 OFFICE O/P EST MOD 30 MIN: CPT

## 2025-04-10 NOTE — PROGRESS NOTES
Date of Office Visit: 04/10/2025  Encounter Provider: TIERRA Collado  Place of Service: [facility] CARDIOLOGY  Patient Name: Nghia Jimenez  :1960    Chief Complaint   Patient presents with    persistent AFIB   :     HPI: Nghia Jimenez is a 64 y.o. male who follows with Dr. Muro, referred to Dr. Max for persistent atrial fibrillation.      In 2023 he was found to be in AF during routine cardiology visit. He was treated with diuretics and CV.      In 2024 he was back in AF. No severe symptoms but with history of HF symptoms related to AF rhythm control was recommended.      He saw Dr. Max in September and elected to proceed with ablation.     2024.  He underwent pulmonary vein isolation with PFA with Dr. Max.     I saw him in 2025.  He was maintaining normal sinus rhythm.  He did not notice any significant change since being back in normal rhythm.          He presents today for follow-up appointment.    Sometime between now and his visit in January.  He is gone back in persistent A-fib.    Looking at the blood pressure readings.  It looks like it started giving him an irregular heartbeat sometime in February.    He is mostly asymptomatic due to his A-fib.  He has some minor fatigue but did not like this really change when he was in normal rhythm.    EKG today shows rate controlled A-fib.    He had PVI in 2024.    He is on apixaban for AC.          Past Medical History:   Diagnosis Date    Atrial fibrillation     Hypertension     Hypoxemia associated with sleep     LYLE (obstructive sleep apnea) 2024    Home sleep study.  Weight 296 pounds.  Severe LYLE with AHI 42.2 events per hour.  Low oxygen saturation 74% and sleep-related hypoxia present for 36.5 minutes.  The patient snored 60.4% total monitoring time.       Past Surgical History:   Procedure Laterality Date    BASAL CELL CARCINOMA EXCISION      CARDIAC  ELECTROPHYSIOLOGY PROCEDURE N/A 11/18/2024    Procedure: Ablation atrial fibrillation PFA;  Surgeon: Kory Max MD;  Location:  [facility] CATH INVASIVE LOCATION;  Service: Cardiovascular;  Laterality: N/A;  name alert    CARDIOVERSION      COLONOSCOPY N/A 07/02/2024    Procedure: COLONOSCOPY TO CECUM  AND TERM. ILEUM;  Surgeon: Jeferson Jimenes MD;  Location:  [facility] ENDOSCOPY;  Service: Gastroenterology;  Laterality: N/A;  PRE OP - SCREENING  POST OP - DIVERTICULOSIS, INT HEMORRHOIDS    GALLBLADDER SURGERY  2022    KIDNEY STONE SURGERY  1995    lithotripsy    KNEE SURGERY Right 1994    arthroscopy - cartilage repair       Social History     Socioeconomic History    Marital status:    Tobacco Use    Smoking status: Never     Passive exposure: Past    Smokeless tobacco: Former     Types: Chew     Quit date: 12/24/2003    Tobacco comments:     Stopped chew years ago     Vaping Use    Vaping status: Never Used   Substance and Sexual Activity    Alcohol use: Not Currently    Drug use: Never       Family History   Problem Relation Age of Onset    Heart attack Mother     Heart disease Mother     Coarctation of the aorta Mother     No Known Problems Father     Malig Hyperthermia Neg Hx        Review of Systems   Constitutional: Negative for chills, fever and malaise/fatigue.   Cardiovascular:  Negative for chest pain, dyspnea on exertion, leg swelling, near-syncope, orthopnea, palpitations, paroxysmal nocturnal dyspnea and syncope.   Respiratory:  Negative for cough and shortness of breath.    Hematologic/Lymphatic: Negative.    Musculoskeletal:  Negative for joint pain, joint swelling and myalgias.   Gastrointestinal:  Negative for abdominal pain, diarrhea, melena, nausea and vomiting.   Genitourinary:  Negative for frequency and hematuria.   Neurological:  Negative for light-headedness, numbness, paresthesias and seizures.   Allergic/Immunologic: Negative.    All other systems reviewed and are  "negative.      No Known Allergies      Current Outpatient Medications:     apixaban (Eliquis) 5 MG tablet tablet, TAKE 1 TABLET BY MOUTH TWICE DAILY, Disp: 180 tablet, Rfl: 4    furosemide (LASIX) 40 MG tablet, Take 1 tablet by mouth Daily., Disp: 90 tablet, Rfl: 3    loratadine (CLARITIN) 10 MG tablet, Take 1 tablet by mouth Every Night., Disp: , Rfl:     metoprolol succinate XL (TOPROL-XL) 50 MG 24 hr tablet, Take 1 tablet by mouth Daily. (Patient taking differently: Take 1 tablet by mouth Daily. Evening), Disp: 90 tablet, Rfl: 3    olmesartan (BENICAR) 20 MG tablet, Take 1 tablet by mouth Daily., Disp: 90 tablet, Rfl: 3      Objective:     Vitals:    04/10/25 0839   BP: 118/80   BP Location: Left arm   Patient Position: Sitting   Cuff Size: Adult   Pulse: 63   Weight: 134 kg (296 lb)   Height: 188 cm (74\")     Body mass index is 38 kg/m².    PHYSICAL EXAM:    Vitals Reviewed.   General Appearance: No acute distress, well developed and well nourished.   Respiratory: No signs of respiratory distress. Respiration rhythm and depth normal.   Cardiovascular:  Heart Rate and Rhythm: Normal  Skin: Warm and dry.   Psychiatric: Patient alert and oriented to person, place, and time. Speech and behavior appropriate. Normal mood and affect.       ECG 12 Lead    Date/Time: 4/10/2025 10:14 AM  Performed by: Marty Watson APRN    Authorized by: Marty Watson APRN  Comparison: compared with previous ECG   Similar to previous ECG  Rhythm: atrial fibrillation            Assessment:       Diagnosis Plan   1. Atrial fibrillation, persistent  Holter Monitor - 72 Hour Up To 15 Days             Plan:   1.  Persistent atrial fibrillation--- status post ablation November 2024----he is now back in persistent A-fib.  It is unclear when he went back in A-fib.  Due to minimal symptoms but he was in sinus rhythm in January.  Given his minimal symptoms, we are going to start with a monitor to assess his A-fib burden, I suspect he is back in " persistent A-fib.    If he is remaining in persistent A-fib and continues to be asymptomatic then.  We discussed and may abandon attempts at rhythm control if he is going in and out of A-fib then may consider additional ablation.        Will call him with monitor results            I spent at least 30 minutes reviewing previous notes, labs, EKGs, device reports and/or time with the patient.         As always, it has been a pleasure to participate in your patient's care.      Sincerely,         TIERRA Collado

## 2025-06-12 ENCOUNTER — OFFICE VISIT (OUTPATIENT)
Age: 65
End: 2025-06-12
Payer: COMMERCIAL

## 2025-06-12 ENCOUNTER — TELEPHONE (OUTPATIENT)
Age: 65
End: 2025-06-12
Payer: COMMERCIAL

## 2025-06-12 VITALS
HEIGHT: 74 IN | WEIGHT: 288 LBS | HEART RATE: 76 BPM | SYSTOLIC BLOOD PRESSURE: 100 MMHG | BODY MASS INDEX: 36.96 KG/M2 | DIASTOLIC BLOOD PRESSURE: 64 MMHG

## 2025-06-12 DIAGNOSIS — E66.01 CLASS 2 SEVERE OBESITY DUE TO EXCESS CALORIES WITH SERIOUS COMORBIDITY AND BODY MASS INDEX (BMI) OF 38.0 TO 38.9 IN ADULT: ICD-10-CM

## 2025-06-12 DIAGNOSIS — I48.19 ATRIAL FIBRILLATION, PERSISTENT: Primary | ICD-10-CM

## 2025-06-12 DIAGNOSIS — E66.812 CLASS 2 SEVERE OBESITY DUE TO EXCESS CALORIES WITH SERIOUS COMORBIDITY AND BODY MASS INDEX (BMI) OF 38.0 TO 38.9 IN ADULT: ICD-10-CM

## 2025-06-12 PROCEDURE — 99214 OFFICE O/P EST MOD 30 MIN: CPT | Performed by: INTERNAL MEDICINE

## 2025-06-12 NOTE — TELEPHONE ENCOUNTER
Received message regarding pt stating he will be going out of town soon and will be at an amusement park. Pt asked if he is able to ride the roller coasters? Having ablation 8/11/25.    Please advise of recommendations.    ANUJA Chino

## 2025-06-13 NOTE — TELEPHONE ENCOUNTER
Pt advised of recommendations and he verbalized appreciation.    ANUJA Chino   INFECTIOUS DISEASE CONSULT  History of Present Illness: 	  This is a 89 year old  F with PMH of HTN hypothyroid a fib PPM p/w low back pain ,  Pt took 2 oxycodone pills pta, c/o nausea. Pt co gen malaise for 2 days, seen by PMD yesterday, started on zofran (hasn't taken yet). No known fever. No cough /sputum. No rash. No hematuria. No agg/allev factors. No other inj or co.    Review of Systems:  · Negative Cardiovascular Symptoms	no chest pain	  · Negative Gastrointestinal Symptoms	no diarrhea; no constipation; no change in bowel habits	  · Gastrointestinal Symptoms	nausea	      Allergies and Intolerances:        Allergies:  	No Known Allergies:     Home Medications:   * Patient Currently Takes Medications as of 11-Apr-2017 11:22 documented in Prescription Writer  · 	levothyroxine 125 mcg (0.125 mg) oral tablet: 1 tab(s) orally once a day, Last Dose Taken:    · 	metoprolol tartrate 50 mg oral tablet: 1 tab(s) orally every 12 hours, Last Dose Taken:    · 	diltiazem 240 mg/24 hours oral capsule, extended release: 1 cap(s) orally once a day, Last Dose Taken:    · 	pravastatin 40 mg oral tablet: 1 tab(s) orally once a day (at bedtime)    Patient History:   Past Medical History:  Atrial fibrillation    HTN (hypertension)    Hyperlipidemia    Hypothyroid    Rheumatoid arthritis.    Past Surgical History:  S/P knee surgery    S/P total hip arthroplasty.    Tobacco Screening:  · Core Measure Site	Yes	  · Has the patient used tobacco in the past 30 days?	No	    Risk Assessment:   Present on Admission:  Deep Venous Thrombosis	no	  Pulmonary Embolus	no	    Heart Failure:   no.      Physical Exam:    Neck : Supple, NO JVD  Ext: + Pulses  · Cardiovascular	detailed exam	  · Cardiovascular Details	regular rate and rhythm	  · Gastrointestinal	detailed exam, NT, + BS	  · GI Normal	soft; nontender, +	  · GI Abnormal	distended	       Laboratory:   General Chemistry:	    11-Apr-2017 06:12, Comprehensive Metabolic Panel	  Sodium, Serum:    134, [135 - 145 mmol/L]	  Potassium, Serum: 4.7, [3.5 - 5.3 mmol/L]	  Chloride, Serum: 100, [96 - 108 mmol/L]	  Carbon Dioxide, Serum: 23, [22 - 31 mmol/L]	  Anion Gap, Serum: 11, [5 - 17 mmol/L]	  Blood Urea Nitrogen, Serum:    42, [7 - 23 mg/dL]	  Creatinine, Serum:    1.60, [0.50 - 1.30 mg/dL]	  Glucose, Serum:    135, [70 - 99 mg/dL]	  Calcium, Total Serum: 9.3, [8.5 - 10.1 mg/dL]	  Protein Total, Serum: 6.5, [6.0 - 8.3 g/dL]	  Albumin, Serum: 3.5, [3.3 - 5.0 g/dL]	  Bilirubin Total, Serum: 0.4, [0.2 - 1.2 mg/dL]	  Alkaline Phosphatase, Serum: 114, [40 - 120 U/L]	  Aspartate Aminotransferase (AST/SGOT):    116, [15 - 37 U/L]	  Alanine Aminotransferase (ALT/SGPT):    102, [12 - 78 U/L]	  eGFR if Non :    27, [>=60 mL/min/1.73M2], Interpretative commentThe units for eGFR are ml/min/1.73m2 (normalized body surface area). TheeGFR is calculated from a serum creatinine using the CKD-EPI equation.Other variables required for calculation are race, age and sex. Amongpatients with chronic kidney disease (CKD), the eGFR is useful indetermining the stage of disease according to KDOQI CKD classification.All eGFR results are reported numerically with the followinginterpretation.        GFR                    With                 Without   (ml/min/1.73 m2)    Kidney Damage       Kidney Damage      >= 90                    Stage 1                     Normal      60-89                    Stage 2                     Decreased GFR      30-59     Stage 3                     Stage 3      15-29                    Stage 4                     Stage 4      < 15                      Stage 5                     Stage 5Each stage of CKD assumes that the associated GFR level has been ineffect for at least 3 months. Determination of stages one and two (witheGFR > 59 ml/min/m2) requires estimation of kidney damage for at least 3months as defined by structural or functional abnormalities.Limitations: All estimates of GFR will be less accurate for patients atextremes of muscle mass (including but not limited to frail elderly,critically ill, or cancer patients), those with unusual diets, and thosewith conditions associated with reduced secretion or extrarenalelimination of creatinine. The eGFR equation is not recommended for usein patients with unstable creatinine levels.	  eGFR if :    32, [>=60 mL/min/1.73M2]	    11-Apr-2017 06:12, Lactate, Blood	  Lactate, Blood:    3.2, [0.7 - 2.0 mmol/L]	    11-Apr-2017 06:12, Lipase, Serum	  Lipase, Serum: 188, [73 - 393 U/L]	    11-Apr-2017 10:11, Lactate, Blood	  Lactate, Blood:    4.1, [0.7 - 2.0 mmol/L]	  Coagulation:	    11-Apr-2017 06:12, Activated Partial Thromboplastin Time	  Activated Partial Thromboplastin Time:    22.3, [27.5 - 37.4 sec], The recommended therapeutic heparin range (full dose) is 58-99 seconds.Recommended therapeutic Argatroban range is 1.5 to 3.0 times the baselineAPTT value, not to exceed 100 seconds. Recommended therapeutic Refludanrange is 1.5 to 2.5 times thebaseline APTT.	    11-Apr-2017 06:12, Prothrombin Time and INR, Plasma	  Prothrombin Time, Plasma:    12.8, [9.8 - 12.7 sec], Effective March 21st, the reference range for PT has changed.	  INR:    1.17, [0.88 - 1.16 ratio], Please note: New Critical Value: 5.0 ratio as of 1/2/14.	  Hematology:	    11-Apr-2017 06:12, Complete Blood Count + Automated Diff	  WBC Count:    12.1, [3.8 - 10.5 K/uL]	  RBC Count: 4.26, [3.80 - 5.20 M/uL]	  Hemoglobin: 12.4, [11.5 - 15.5 g/dL]	  Hematocrit: 39.5, [34.5 - 45.0 %]	  Mean Cell Volume: 92.9, [80.0 - 100.0 fl]	  Mean Cell Hemoglobin: 29.1, [27.0 - 34.0 pg]	  Mean Cell Hemoglobin Conc:    31.3, [32.0 - 36.0 gm/dL]	  Red Cell Distrib Width: 14.2, [10.3 - 14.5 %]	  Platelet Count - Automated: 221, [150 - 400 K/uL]	  Auto Neutrophil #:    9.1, [1.8 - 7.4 K/uL]	  Auto Lymphocyte #: 1.9, [1.0 - 3.3 K/uL]	  Auto Monocyte #:    1.0, [0.0 - 0.9 K/uL]	  Auto Eosinophil #: 0.0, [0.0 - 0.5 K/uL]	  Auto Basophil #: 0.0, [0.0 - 0.2 K/uL]	  Auto Neutrophil %: 75.5, [43.0 - 77.0 %], Differential percentages must be correlated with absolute numbers forclinical significance.	  Auto Lymphocyte %: 15.6, [13.0 - 44.0 %]	  Auto Monocyte %: 8.2, [1.0 - 9.0 %]	  Auto Eosinophil %: 0.3, [0.0 - 6.0 %]	  Auto Basophil %: 0.4, [0.0 - 2.0 %]	  Urine:	    11-Apr-2017 06:12, Urinalysis	  Color:    Brown, [Yellow]	  Urine Appearance:  , [Clear], Slightly Turbid	  Bilirubin:    Small, [Negative]	  Ketone - Urine:    Trace, [Negative]	  Specific Gravity: 1.020, [1.010 - 1.025]	  Protein, Urine:    150, [Negative mg/dL]	  Urobilinogen:    4, [Negative]	  Nitrite:    Positive, [Negative]	  Leukocyte Esterase Concentration:    Moderate, [Negative]	  Blood, Urine:    Large, [Negative]	  Glucose Qualitative, Urine: Negative, [Negative]	  pH Urine: 6.5, [4.8 - 8.0]	    11-Apr-2017 06:12, Urine Microscopic-Add On (NC)	  Bacteria:    Moderate, [Negative]	  Epithelial Cells:    Moderate, [Neg. - Few]	  Red Blood Cell - Urine:    25-50, [0 - 4 /HPF]	  White Blood Cell - Urine:    26-50, [Negative]	    Radiology:   X-Ray, Fluoroscopy:	    11-Apr-2017 05:48, Xray Chest 1 View AP/PA	  PACS Image: Image(s) Available	  Xray Chest 1 View AP/PA: EXAM:  CHEST 1 VIEW                      PROCEDURE DATE:  04/11/2017  INTERPRETATION:  Back pain.AP chest. Prior 7/17/2015.Left cardiac pacer. Cardiomegaly despite AP projection. Atherosclerotic aorta. No focal consolidation or pleural effusion bilaterally. High riding humeral heads suggests chronic rotator cuff pathology.Impression: Cardiomegaly. No active infiltrates.LANCE BHATT M.D., ATTENDING RADIOLOGISTThis document has been electronically signed. Apr 11 2017  7:20AM	    11-Apr-2017 05:49, Xray Lumbosacral Spine	  PACS Image: Image(s) Available	  Xray Lumbosacral Spine: EXAM:  LUMBO-SACRAL SPINE                      PROCEDURE DATE:  04/11/2017  INTERPRETATION:  Back pain3 views lumbar spine.Moderate rotatory dextroscoliosis. No fracture. No listhesis. Multilevel degenerative spondylosis and disc space narrowing. Multilevel vacuum disc phenomena. Facet arthropathy L5/S1. Bilateral THR. Vascular calcification.Impression: No fracture. Advanced degenerative spondylosis and disc disease.LANCE BHATT M.D., ATTENDING RADIOLOGISTThis document has been electronically signed. Apr 11 2017  8:38AM	    11-Apr-2017 11:14, Xray Abdomen Series Flat/Upright 2 View	  PACS Image: Image(s) Available	  Xray Abdomen Series Flat/Upright 2 View: EXAM:  ABDOMEN TWO VIEW                      PROCEDURE DATE:  04/11/2017  INTERPRETATION:  PROCEDURE: AP, upright and supine views of the abdomenCLINICAL INFORMATION: Nausea and abdominal painFINDINGS:Cardiac conduction device and lead are partially imaged. The heart is enlarged.The bowel gas pattern is nonobstructive. Air and stool is noted within the large bowel and rectum. There are no abnormal calcific densities. The bones are intact. The patient is status post bilateral hip arthroplasty.There is no evidence for free air.IMPRESSION:No bowel obstruction or free air.SKINNY CHRISTENSEN M.D., ATTENDING RADIOLOGISTThis document has been electronically signed. Apr 11 2017 11:23AM	      Assessment and Plan:     89 F comes with altered mental status, nausea and back pain  Urinary tract infection without hematuria  R/O Sepsis  KURTIS  HTN  Hypothyroidism  Continue IV Ceftriaxon 1 gm Q daily  F/UP Urin culture and blood culture  pan-culture  ivf, I & o's   monitor lactate.   zofran prn  pain meds  DW Dr. CASANDRA Vazquez  will follow      .

## 2025-07-08 RX ORDER — APIXABAN 5 MG/1
5 TABLET, FILM COATED ORAL 2 TIMES DAILY
Qty: 180 TABLET | Refills: 4 | Status: SHIPPED | OUTPATIENT
Start: 2025-07-08

## 2025-07-08 NOTE — Clinical Note
Sheath inserted into accessed vessel. RFV #2   Pleura: No pleural effusions or pneumothorax. Lower Neck: Unremarkable. Pulmonary Embolism: PE: No RV Strain: N/A Heart: The heart is normal in size. Additional vascular structures: Aorta is normal in caliber. Main pulmonary artery is normal in caliber. Mediastinum and aki: No pathologically enlarged lymph nodes. Chest wall and axilla: Unremarkable. Musculoskeletal: No acute osseous abnormality. Upper abdomen: Unremarkable.     No acute pulmonary embolism. Electronically signed by Gary Nunes MD    CT HIP LEFT WO CONTRAST  Result Date: 7/7/2025  History: Pelvic pain. Hip pain. CT of the left hip without contrast TECHNIQUE: CT images were obtained of the pelvis and left hip without use of contrast. Axial CT dose FINDINGS: Sclerotic and erosive changes identified along the bilateral sacroiliac joints suggesting possible sacroiliitis. Hips are unremarkable. No joint effusion identified. No fracture or subluxation. Intrauterine device identified. No adnexal mass.     1. Mild erosive and sclerotic changes along the sacroiliac joints suggesting possible sacroiliitis. 2. No hip abnormality. Electronically signed by Jeffery Muhammad MD    POC US CHEST INCLUDING MEDIASTINUM  Result Date: 7/7/2025  POCUS_Thoracic     Exam Information:          Exam type:  Clinically indicated     Indication(s) for Exam:          The exam was performed with the following indications::  Dyspnea     Views:          Right anterior / superior thorax:  Adequate         Right lateral / inferior thorax:  Adequate         Left anterior / superior thorax:  Adequate         Left lateral / inferior thorax:  Adequate     Findings:          Lung sliding:  Present         Lung point sign:  Absent         Interstitium: a-lines:  Present     Interpretation:          Pneumothorax:  Not present         No sonographic evidence of acute pulmonary disease  Electronically signed by Félix Kapadia on Monday, July 7, 2025 at 8:10 PM : Attending:     [facility] HIP

## 2025-07-09 PROCEDURE — 93000 ELECTROCARDIOGRAM COMPLETE: CPT | Performed by: INTERNAL MEDICINE

## 2025-07-10 NOTE — PROGRESS NOTES
Date of Office Visit: 2025  Encounter Provider: Kory Max MD  Place of Service: [facility] CARDIOLOGY  Patient Name: Nghia Jimenez  :1960    Chief Complaint   Patient presents with    persistent AFIB   :     HPI: Nghia Jimenez is a 64 y.o. male who presents today for persistent atrial fibrillation.     He has persistent atrial fibrillation with one prior attempt at ablation in 2024.      He remained in sinus rhythm for a couple of months, but was then back in persistent atrial fibrillation.     Initially, did not feel that symptomatic, but know notes more fatigue and breathlessness.           Past Medical History:   Diagnosis Date    Atrial fibrillation     Hypertension     Hypoxemia associated with sleep     LYLE (obstructive sleep apnea) 2024    Home sleep study.  Weight 296 pounds.  Severe LYLE with AHI 42.2 events per hour.  Low oxygen saturation 74% and sleep-related hypoxia present for 36.5 minutes.  The patient snored 60.4% total monitoring time.       Past Surgical History:   Procedure Laterality Date    BASAL CELL CARCINOMA EXCISION      CARDIAC ELECTROPHYSIOLOGY PROCEDURE N/A 2024    Procedure: Ablation atrial fibrillation PFA;  Surgeon: Kory Max MD;  Location: [facility] CATH INVASIVE LOCATION;  Service: Cardiovascular;  Laterality: N/A;  name alert    CARDIOVERSION      COLONOSCOPY N/A 2024    Procedure: COLONOSCOPY TO CECUM  AND TERM. ILEUM;  Surgeon: Jeferson Jimenes MD;  Location: [facility] ENDOSCOPY;  Service: Gastroenterology;  Laterality: N/A;  PRE OP - SCREENING  POST OP - DIVERTICULOSIS, INT HEMORRHOIDS    GALLBLADDER SURGERY      KIDNEY STONE SURGERY      lithotripsy    KNEE SURGERY Right     arthroscopy - cartilage repair       Social History     Socioeconomic History    Marital status:    Tobacco Use    Smoking status: Never     Passive exposure: Past    Smokeless tobacco: Former     Types:  "Chew     Quit date: 12/24/2003    Tobacco comments:     Stopped chew years ago     Vaping Use    Vaping status: Never Used   Substance and Sexual Activity    Alcohol use: Not Currently    Drug use: Never       Family History   Problem Relation Age of Onset    Heart attack Mother     Heart disease Mother     Coarctation of the aorta Mother     No Known Problems Father     Malig Hyperthermia Neg Hx        Review of Systems   Constitutional: Negative.   Cardiovascular: Negative.    Respiratory: Negative.     Gastrointestinal: Negative.        No Known Allergies      Current Outpatient Medications:     furosemide (LASIX) 40 MG tablet, Take 1 tablet by mouth Daily., Disp: 90 tablet, Rfl: 3    loratadine (CLARITIN) 10 MG tablet, Take 1 tablet by mouth Every Night., Disp: , Rfl:     metoprolol succinate XL (TOPROL-XL) 50 MG 24 hr tablet, Take 1 tablet by mouth Daily. (Patient taking differently: Take 1 tablet by mouth Daily. Evening), Disp: 90 tablet, Rfl: 3    olmesartan (BENICAR) 20 MG tablet, Take 1 tablet by mouth Daily., Disp: 90 tablet, Rfl: 3    Eliquis 5 MG tablet tablet, TAKE 1 TABLET BY MOUTH TWICE DAILY, Disp: 180 tablet, Rfl: 4      Objective:     Vitals:    06/12/25 1024   BP: 100/64   BP Location: Right arm   Patient Position: Sitting   Pulse: 76   Weight: 131 kg (288 lb)   Height: 188 cm (74\")     Body mass index is 36.98 kg/m².    PHYSICAL EXAM:    Vitals and nursing note reviewed.   Constitutional:       General: Not in acute distress.  Pulmonary:      Effort: Pulmonary effort is normal. No respiratory distress.   Cardiovascular:      Normal rate. Irregular rhythm.   Edema:     Peripheral edema absent.   Skin:     General: Skin is warm and dry.   Neurological:      Mental Status: Alert and oriented to person, place, and time.   Psychiatric:         Behavior: Behavior normal.         Thought Content: Thought content normal.         Judgment: Judgment normal.             ECG 12 Lead    Date/Time: 7/9/2025 " 11:11 PM  Performed by: Kory Max MD    Authorized by: Kory Max MD  Comparison: compared with previous ECG from 4/10/2025  Similar to previous ECG  Comparison to previous ECG: More frequent PVCs on current EKG  Rhythm: atrial fibrillation  Ectopy: unifocal PVCs            Assessment:       Diagnosis Plan   1. Atrial fibrillation, persistent  Case Request EP Lab: Ablation atrial fibrillation    NPO After Midnight    CBC (No Diff)    Basic Metabolic Panel    Pre-Procedure IV Hydration Not Needed    Case Request EP Lab: Ablation atrial fibrillation      2. Class 2 severe obesity due to excess calories with serious comorbidity and body mass index (BMI) of 38.0 to 38.9 in adult               Plan:       We discussed potential benefits of making further efforts at rhythm control.     We discussed option for antiarrhythmics or repeat ablation.      He prefers further ablation.     We discussed risk of stroke and cardiac injury.        As always, it has been a pleasure to participate in your patient's care.      Sincerely,         Kory Max MD

## 2025-07-21 RX ORDER — METOPROLOL SUCCINATE 50 MG/1
50 TABLET, EXTENDED RELEASE ORAL DAILY
Qty: 90 TABLET | Refills: 3 | Status: SHIPPED | OUTPATIENT
Start: 2025-07-21

## 2025-08-04 ENCOUNTER — LAB (OUTPATIENT)
Dept: LAB | Facility: HOSPITAL | Age: 65
End: 2025-08-04
Payer: COMMERCIAL

## 2025-08-04 DIAGNOSIS — I48.19 ATRIAL FIBRILLATION, PERSISTENT: ICD-10-CM

## 2025-08-04 LAB
ANION GAP SERPL CALCULATED.3IONS-SCNC: 7.1 MMOL/L (ref 5–15)
BUN SERPL-MCNC: 18 MG/DL (ref 8–23)
BUN/CREAT SERPL: 17 (ref 7–25)
CALCIUM SPEC-SCNC: 9 MG/DL (ref 8.6–10.5)
CHLORIDE SERPL-SCNC: 105 MMOL/L (ref 98–107)
CO2 SERPL-SCNC: 28.9 MMOL/L (ref 22–29)
CREAT SERPL-MCNC: 1.06 MG/DL (ref 0.76–1.27)
DEPRECATED RDW RBC AUTO: 45.1 FL (ref 37–54)
EGFRCR SERPLBLD CKD-EPI 2021: 78.9 ML/MIN/1.73
ERYTHROCYTE [DISTWIDTH] IN BLOOD BY AUTOMATED COUNT: 13.2 % (ref 12.3–15.4)
GLUCOSE SERPL-MCNC: 112 MG/DL (ref 65–99)
HCT VFR BLD AUTO: 47 % (ref 37.5–51)
HGB BLD-MCNC: 16.1 G/DL (ref 13–17.7)
MCH RBC QN AUTO: 32.1 PG (ref 26.6–33)
MCHC RBC AUTO-ENTMCNC: 34.3 G/DL (ref 31.5–35.7)
MCV RBC AUTO: 93.6 FL (ref 79–97)
PLATELET # BLD AUTO: 161 10*3/MM3 (ref 140–450)
PMV BLD AUTO: 9.1 FL (ref 6–12)
POTASSIUM SERPL-SCNC: 4.8 MMOL/L (ref 3.5–5.2)
RBC # BLD AUTO: 5.02 10*6/MM3 (ref 4.14–5.8)
SODIUM SERPL-SCNC: 141 MMOL/L (ref 136–145)
WBC NRBC COR # BLD AUTO: 4.88 10*3/MM3 (ref 3.4–10.8)

## 2025-08-04 PROCEDURE — 36415 COLL VENOUS BLD VENIPUNCTURE: CPT

## 2025-08-04 PROCEDURE — 80048 BASIC METABOLIC PNL TOTAL CA: CPT

## 2025-08-04 PROCEDURE — 85027 COMPLETE CBC AUTOMATED: CPT

## 2025-08-07 ENCOUNTER — TELEPHONE (OUTPATIENT)
Dept: CARDIOLOGY | Age: 65
End: 2025-08-07
Payer: COMMERCIAL

## 2025-08-11 ENCOUNTER — ANESTHESIA (OUTPATIENT)
Dept: CARDIOLOGY | Facility: HOSPITAL | Age: 65
End: 2025-08-11
Payer: COMMERCIAL

## 2025-08-11 ENCOUNTER — ANESTHESIA EVENT (OUTPATIENT)
Dept: CARDIOLOGY | Facility: HOSPITAL | Age: 65
End: 2025-08-11
Payer: COMMERCIAL

## 2025-08-11 ENCOUNTER — HOSPITAL ENCOUNTER (OUTPATIENT)
Facility: HOSPITAL | Age: 65
Setting detail: HOSPITAL OUTPATIENT SURGERY
Discharge: HOME | End: 2025-08-11
Attending: INTERNAL MEDICINE | Admitting: INTERNAL MEDICINE
Payer: COMMERCIAL

## 2025-08-11 VITALS
RESPIRATION RATE: 16 BRPM | SYSTOLIC BLOOD PRESSURE: 114 MMHG | WEIGHT: 286 LBS | HEIGHT: 74 IN | DIASTOLIC BLOOD PRESSURE: 79 MMHG | OXYGEN SATURATION: 98 % | HEART RATE: 67 BPM | BODY MASS INDEX: 36.7 KG/M2 | TEMPERATURE: 96.6 F

## 2025-08-11 DIAGNOSIS — I48.19 ATRIAL FIBRILLATION, PERSISTENT: ICD-10-CM

## 2025-08-11 LAB
ACT BLD: 314 SECONDS (ref 82–152)
ACT BLD: 337 SECONDS (ref 82–152)
QT INTERVAL: 394 MS
QT INTERVAL: 402 MS
QTC INTERVAL: 388 MS
QTC INTERVAL: 429 MS

## 2025-08-11 PROCEDURE — C1760 CLOSURE DEV, VASC: HCPCS | Performed by: INTERNAL MEDICINE

## 2025-08-11 PROCEDURE — 93005 ELECTROCARDIOGRAM TRACING: CPT | Performed by: INTERNAL MEDICINE

## 2025-08-11 PROCEDURE — 25010000002 LIDOCAINE-EPINEPHRINE (PF) 1 %-1:200000 SOLUTION: Performed by: INTERNAL MEDICINE

## 2025-08-11 PROCEDURE — 25010000002 PHENYLEPHRINE 10 MG/ML SOLUTION

## 2025-08-11 PROCEDURE — 25010000002 FAMOTIDINE 10 MG/ML SOLUTION: Performed by: ANESTHESIOLOGY

## 2025-08-11 PROCEDURE — C1766 INTRO/SHEATH,STRBLE,NON-PEEL: HCPCS | Performed by: INTERNAL MEDICINE

## 2025-08-11 PROCEDURE — 25010000002 LIDOCAINE 2% SOLUTION

## 2025-08-11 PROCEDURE — C1894 INTRO/SHEATH, NON-LASER: HCPCS | Performed by: INTERNAL MEDICINE

## 2025-08-11 PROCEDURE — 93656 COMPRE EP EVAL ABLTJ ATR FIB: CPT | Performed by: INTERNAL MEDICINE

## 2025-08-11 PROCEDURE — 93657 TX L/R ATRIAL FIB ADDL: CPT | Performed by: INTERNAL MEDICINE

## 2025-08-11 PROCEDURE — C1893 INTRO/SHEATH, FIXED,NON-PEEL: HCPCS | Performed by: INTERNAL MEDICINE

## 2025-08-11 PROCEDURE — 25010000002 GLYCOPYRROLATE 0.2 MG/ML SOLUTION

## 2025-08-11 PROCEDURE — 25810000003 SODIUM CHLORIDE 0.9 % SOLUTION: Performed by: INTERNAL MEDICINE

## 2025-08-11 PROCEDURE — 25010000002 HEPARIN (PORCINE) PER 1000 UNITS: Performed by: INTERNAL MEDICINE

## 2025-08-11 PROCEDURE — C1713 ANCHOR/SCREW BN/BN,TIS/BN: HCPCS | Performed by: INTERNAL MEDICINE

## 2025-08-11 PROCEDURE — 25010000002 DEXAMETHASONE PER 1 MG

## 2025-08-11 PROCEDURE — 25010000002 PROTAMINE SULFATE PER 10 MG: Performed by: INTERNAL MEDICINE

## 2025-08-11 PROCEDURE — 25810000003 SODIUM CHLORIDE 0.9 % SOLUTION 250 ML FLEX CONT

## 2025-08-11 PROCEDURE — C1730 CATH, EP, 19 OR FEW ELECT: HCPCS | Performed by: INTERNAL MEDICINE

## 2025-08-11 PROCEDURE — 25010000002 FENTANYL CITRATE (PF) 100 MCG/2ML SOLUTION

## 2025-08-11 PROCEDURE — 85347 COAGULATION TIME ACTIVATED: CPT

## 2025-08-11 PROCEDURE — 93010 ELECTROCARDIOGRAM REPORT: CPT | Performed by: INTERNAL MEDICINE

## 2025-08-11 PROCEDURE — S0260 H&P FOR SURGERY: HCPCS | Performed by: INTERNAL MEDICINE

## 2025-08-11 PROCEDURE — 25010000002 ONDANSETRON PER 1 MG

## 2025-08-11 PROCEDURE — 25010000002 PROPOFOL 10 MG/ML EMULSION

## 2025-08-11 PROCEDURE — 25010000002 SUGAMMADEX 200 MG/2ML SOLUTION

## 2025-08-11 PROCEDURE — C1759 CATH, INTRA ECHOCARDIOGRAPHY: HCPCS | Performed by: INTERNAL MEDICINE

## 2025-08-11 PROCEDURE — 25010000002 PHENYLEPHRINE 10 MG/ML SOLUTION 5 ML VIAL

## 2025-08-11 RX ORDER — SODIUM CHLORIDE 9 MG/ML
50 INJECTION, SOLUTION INTRAVENOUS CONTINUOUS
Status: DISCONTINUED | OUTPATIENT
Start: 2025-08-12 | End: 2025-08-11

## 2025-08-11 RX ORDER — ONDANSETRON 2 MG/ML
4 INJECTION INTRAMUSCULAR; INTRAVENOUS ONCE AS NEEDED
Status: DISCONTINUED | OUTPATIENT
Start: 2025-08-11 | End: 2025-08-11 | Stop reason: HOSPADM

## 2025-08-11 RX ORDER — PROTAMINE SULFATE 10 MG/ML
INJECTION, SOLUTION INTRAVENOUS
Status: DISCONTINUED | OUTPATIENT
Start: 2025-08-11 | End: 2025-08-11 | Stop reason: HOSPADM

## 2025-08-11 RX ORDER — ATROPINE SULFATE 0.4 MG/ML
0.4 INJECTION, SOLUTION INTRAMUSCULAR; INTRAVENOUS; SUBCUTANEOUS ONCE AS NEEDED
Status: DISCONTINUED | OUTPATIENT
Start: 2025-08-11 | End: 2025-08-11 | Stop reason: HOSPADM

## 2025-08-11 RX ORDER — SODIUM CHLORIDE, SODIUM LACTATE, POTASSIUM CHLORIDE, CALCIUM CHLORIDE 600; 310; 30; 20 MG/100ML; MG/100ML; MG/100ML; MG/100ML
9 INJECTION, SOLUTION INTRAVENOUS CONTINUOUS
Status: DISCONTINUED | OUTPATIENT
Start: 2025-08-11 | End: 2025-08-11 | Stop reason: HOSPADM

## 2025-08-11 RX ORDER — FENTANYL CITRATE 50 UG/ML
50 INJECTION, SOLUTION INTRAMUSCULAR; INTRAVENOUS ONCE AS NEEDED
Status: DISCONTINUED | OUTPATIENT
Start: 2025-08-11 | End: 2025-08-11 | Stop reason: HOSPADM

## 2025-08-11 RX ORDER — GLYCOPYRROLATE 0.2 MG/ML
INJECTION INTRAMUSCULAR; INTRAVENOUS AS NEEDED
Status: DISCONTINUED | OUTPATIENT
Start: 2025-08-11 | End: 2025-08-11 | Stop reason: SURG

## 2025-08-11 RX ORDER — ACETAMINOPHEN 650 MG/1
650 SUPPOSITORY RECTAL EVERY 4 HOURS PRN
Status: CANCELLED | OUTPATIENT
Start: 2025-08-11

## 2025-08-11 RX ORDER — ONDANSETRON 2 MG/ML
4 INJECTION INTRAMUSCULAR; INTRAVENOUS EVERY 6 HOURS PRN
Status: CANCELLED | OUTPATIENT
Start: 2025-08-11

## 2025-08-11 RX ORDER — DEXAMETHASONE SODIUM PHOSPHATE 4 MG/ML
INJECTION, SOLUTION INTRA-ARTICULAR; INTRALESIONAL; INTRAMUSCULAR; INTRAVENOUS; SOFT TISSUE AS NEEDED
Status: DISCONTINUED | OUTPATIENT
Start: 2025-08-11 | End: 2025-08-11 | Stop reason: SURG

## 2025-08-11 RX ORDER — NALOXONE HCL 0.4 MG/ML
0.2 VIAL (ML) INJECTION AS NEEDED
Status: DISCONTINUED | OUTPATIENT
Start: 2025-08-11 | End: 2025-08-11 | Stop reason: HOSPADM

## 2025-08-11 RX ORDER — PROPOFOL 10 MG/ML
VIAL (ML) INTRAVENOUS AS NEEDED
Status: DISCONTINUED | OUTPATIENT
Start: 2025-08-11 | End: 2025-08-11 | Stop reason: SURG

## 2025-08-11 RX ORDER — ROCURONIUM BROMIDE 10 MG/ML
INJECTION, SOLUTION INTRAVENOUS AS NEEDED
Status: DISCONTINUED | OUTPATIENT
Start: 2025-08-11 | End: 2025-08-11 | Stop reason: SURG

## 2025-08-11 RX ORDER — FLUMAZENIL 0.1 MG/ML
0.2 INJECTION INTRAVENOUS AS NEEDED
Status: DISCONTINUED | OUTPATIENT
Start: 2025-08-11 | End: 2025-08-11 | Stop reason: HOSPADM

## 2025-08-11 RX ORDER — HYDROMORPHONE HYDROCHLORIDE 1 MG/ML
0.5 INJECTION, SOLUTION INTRAMUSCULAR; INTRAVENOUS; SUBCUTANEOUS
Refills: 0 | Status: CANCELLED | OUTPATIENT
Start: 2025-08-11 | End: 2025-08-18

## 2025-08-11 RX ORDER — FAMOTIDINE 10 MG/ML
20 INJECTION, SOLUTION INTRAVENOUS ONCE
Status: COMPLETED | OUTPATIENT
Start: 2025-08-11 | End: 2025-08-11

## 2025-08-11 RX ORDER — LIDOCAINE HYDROCHLORIDE 10 MG/ML
0.5 INJECTION, SOLUTION INFILTRATION; PERINEURAL ONCE AS NEEDED
Status: DISCONTINUED | OUTPATIENT
Start: 2025-08-11 | End: 2025-08-11 | Stop reason: HOSPADM

## 2025-08-11 RX ORDER — FENTANYL CITRATE 50 UG/ML
50 INJECTION, SOLUTION INTRAMUSCULAR; INTRAVENOUS
Status: DISCONTINUED | OUTPATIENT
Start: 2025-08-11 | End: 2025-08-11 | Stop reason: HOSPADM

## 2025-08-11 RX ORDER — SODIUM CHLORIDE 0.9 % (FLUSH) 0.9 %
10 SYRINGE (ML) INJECTION EVERY 12 HOURS SCHEDULED
Status: DISCONTINUED | OUTPATIENT
Start: 2025-08-11 | End: 2025-08-11 | Stop reason: HOSPADM

## 2025-08-11 RX ORDER — PHENYLEPHRINE HYDROCHLORIDE 10 MG/ML
INJECTION INTRAVENOUS AS NEEDED
Status: DISCONTINUED | OUTPATIENT
Start: 2025-08-11 | End: 2025-08-11 | Stop reason: SURG

## 2025-08-11 RX ORDER — SODIUM CHLORIDE 0.9 % (FLUSH) 0.9 %
3-10 SYRINGE (ML) INJECTION AS NEEDED
Status: DISCONTINUED | OUTPATIENT
Start: 2025-08-11 | End: 2025-08-11 | Stop reason: HOSPADM

## 2025-08-11 RX ORDER — SODIUM CHLORIDE 0.9 % (FLUSH) 0.9 %
10 SYRINGE (ML) INJECTION AS NEEDED
Status: DISCONTINUED | OUTPATIENT
Start: 2025-08-11 | End: 2025-08-11 | Stop reason: HOSPADM

## 2025-08-11 RX ORDER — DROPERIDOL 2.5 MG/ML
0.62 INJECTION, SOLUTION INTRAMUSCULAR; INTRAVENOUS
Status: DISCONTINUED | OUTPATIENT
Start: 2025-08-11 | End: 2025-08-11 | Stop reason: HOSPADM

## 2025-08-11 RX ORDER — PROMETHAZINE HYDROCHLORIDE 12.5 MG/1
25 TABLET ORAL ONCE AS NEEDED
Status: DISCONTINUED | OUTPATIENT
Start: 2025-08-11 | End: 2025-08-11 | Stop reason: HOSPADM

## 2025-08-11 RX ORDER — NALOXONE HCL 0.4 MG/ML
0.4 VIAL (ML) INJECTION
Status: CANCELLED | OUTPATIENT
Start: 2025-08-11

## 2025-08-11 RX ORDER — ACETAMINOPHEN 325 MG/1
650 TABLET ORAL EVERY 4 HOURS PRN
Status: CANCELLED | OUTPATIENT
Start: 2025-08-11

## 2025-08-11 RX ORDER — PROMETHAZINE HYDROCHLORIDE 25 MG/1
25 SUPPOSITORY RECTAL ONCE AS NEEDED
Status: DISCONTINUED | OUTPATIENT
Start: 2025-08-11 | End: 2025-08-11 | Stop reason: HOSPADM

## 2025-08-11 RX ORDER — ONDANSETRON 2 MG/ML
INJECTION INTRAMUSCULAR; INTRAVENOUS AS NEEDED
Status: DISCONTINUED | OUTPATIENT
Start: 2025-08-11 | End: 2025-08-11 | Stop reason: SURG

## 2025-08-11 RX ORDER — EPHEDRINE SULFATE 50 MG/ML
5 INJECTION, SOLUTION INTRAVENOUS ONCE AS NEEDED
Status: DISCONTINUED | OUTPATIENT
Start: 2025-08-11 | End: 2025-08-11 | Stop reason: HOSPADM

## 2025-08-11 RX ORDER — IPRATROPIUM BROMIDE AND ALBUTEROL SULFATE 2.5; .5 MG/3ML; MG/3ML
3 SOLUTION RESPIRATORY (INHALATION) ONCE AS NEEDED
Status: DISCONTINUED | OUTPATIENT
Start: 2025-08-11 | End: 2025-08-11 | Stop reason: HOSPADM

## 2025-08-11 RX ORDER — DIPHENHYDRAMINE HYDROCHLORIDE 50 MG/ML
12.5 INJECTION, SOLUTION INTRAMUSCULAR; INTRAVENOUS
Status: DISCONTINUED | OUTPATIENT
Start: 2025-08-11 | End: 2025-08-11 | Stop reason: HOSPADM

## 2025-08-11 RX ORDER — MIDAZOLAM HYDROCHLORIDE 1 MG/ML
1 INJECTION, SOLUTION INTRAMUSCULAR; INTRAVENOUS
Status: DISCONTINUED | OUTPATIENT
Start: 2025-08-11 | End: 2025-08-11 | Stop reason: HOSPADM

## 2025-08-11 RX ORDER — FENTANYL CITRATE 50 UG/ML
INJECTION, SOLUTION INTRAMUSCULAR; INTRAVENOUS AS NEEDED
Status: DISCONTINUED | OUTPATIENT
Start: 2025-08-11 | End: 2025-08-11 | Stop reason: SURG

## 2025-08-11 RX ORDER — LIDOCAINE HYDROCHLORIDE 20 MG/ML
INJECTION, SOLUTION INFILTRATION; PERINEURAL AS NEEDED
Status: DISCONTINUED | OUTPATIENT
Start: 2025-08-11 | End: 2025-08-11 | Stop reason: SURG

## 2025-08-11 RX ORDER — SODIUM CHLORIDE 9 MG/ML
50 INJECTION, SOLUTION INTRAVENOUS CONTINUOUS
Status: ACTIVE | OUTPATIENT
Start: 2025-08-11 | End: 2025-08-11

## 2025-08-11 RX ORDER — SODIUM CHLORIDE 0.9 % (FLUSH) 0.9 %
3 SYRINGE (ML) INJECTION EVERY 12 HOURS SCHEDULED
Status: DISCONTINUED | OUTPATIENT
Start: 2025-08-11 | End: 2025-08-11 | Stop reason: HOSPADM

## 2025-08-11 RX ORDER — LABETALOL HYDROCHLORIDE 5 MG/ML
5 INJECTION, SOLUTION INTRAVENOUS
Status: DISCONTINUED | OUTPATIENT
Start: 2025-08-11 | End: 2025-08-11 | Stop reason: HOSPADM

## 2025-08-11 RX ORDER — HYDRALAZINE HYDROCHLORIDE 20 MG/ML
5 INJECTION INTRAMUSCULAR; INTRAVENOUS
Status: DISCONTINUED | OUTPATIENT
Start: 2025-08-11 | End: 2025-08-11 | Stop reason: HOSPADM

## 2025-08-11 RX ORDER — HEPARIN SODIUM 1000 [USP'U]/ML
INJECTION, SOLUTION INTRAVENOUS; SUBCUTANEOUS
Status: DISCONTINUED | OUTPATIENT
Start: 2025-08-11 | End: 2025-08-11 | Stop reason: HOSPADM

## 2025-08-11 RX ADMIN — SUGAMMADEX 400 MG: 100 INJECTION, SOLUTION INTRAVENOUS at 12:29

## 2025-08-11 RX ADMIN — GLYCOPYRROLATE 0.2 MG: 0.2 INJECTION INTRAMUSCULAR; INTRAVENOUS at 12:00

## 2025-08-11 RX ADMIN — ROCURONIUM BROMIDE 20 MG: 10 INJECTION INTRAVENOUS at 11:50

## 2025-08-11 RX ADMIN — ROCURONIUM BROMIDE 20 MG: 10 INJECTION INTRAVENOUS at 12:12

## 2025-08-11 RX ADMIN — SODIUM CHLORIDE: 9 INJECTION, SOLUTION INTRAVENOUS at 12:46

## 2025-08-11 RX ADMIN — ROCURONIUM BROMIDE 100 MG: 10 INJECTION INTRAVENOUS at 10:50

## 2025-08-11 RX ADMIN — PROPOFOL 50 MG: 10 INJECTION, EMULSION INTRAVENOUS at 10:54

## 2025-08-11 RX ADMIN — ONDANSETRON 4 MG: 2 INJECTION INTRAMUSCULAR; INTRAVENOUS at 11:00

## 2025-08-11 RX ADMIN — ROCURONIUM BROMIDE 30 MG: 10 INJECTION INTRAVENOUS at 12:02

## 2025-08-11 RX ADMIN — PHENYLEPHRINE HYDROCHLORIDE 100 MCG: 10 INJECTION INTRAVENOUS at 11:05

## 2025-08-11 RX ADMIN — DEXAMETHASONE SODIUM PHOSPHATE 8 MG: 4 INJECTION, SOLUTION INTRA-ARTICULAR; INTRALESIONAL; INTRAMUSCULAR; INTRAVENOUS; SOFT TISSUE at 11:00

## 2025-08-11 RX ADMIN — PHENYLEPHRINE HYDROCHLORIDE 100 MCG: 10 INJECTION INTRAVENOUS at 11:12

## 2025-08-11 RX ADMIN — SODIUM CHLORIDE 50 ML/HR: 9 INJECTION, SOLUTION INTRAVENOUS at 08:24

## 2025-08-11 RX ADMIN — PROPOFOL 250 MG: 10 INJECTION, EMULSION INTRAVENOUS at 10:50

## 2025-08-11 RX ADMIN — FAMOTIDINE 20 MG: 10 INJECTION INTRAVENOUS at 09:50

## 2025-08-11 RX ADMIN — FENTANYL CITRATE 50 MCG: 50 INJECTION, SOLUTION INTRAMUSCULAR; INTRAVENOUS at 12:40

## 2025-08-11 RX ADMIN — LIDOCAINE HYDROCHLORIDE 100 MG: 20 INJECTION, SOLUTION INFILTRATION; PERINEURAL at 10:50

## 2025-08-11 RX ADMIN — PHENYLEPHRINE HYDROCHLORIDE 0.4 MCG/KG/MIN: 10 INJECTION, SOLUTION INTRAVENOUS at 11:16

## 2025-08-28 ENCOUNTER — OFFICE VISIT (OUTPATIENT)
Dept: SLEEP MEDICINE | Facility: HOSPITAL | Age: 65
End: 2025-08-28
Payer: COMMERCIAL

## 2025-08-28 VITALS — HEART RATE: 75 BPM | BODY MASS INDEX: 37.35 KG/M2 | HEIGHT: 74 IN | WEIGHT: 291 LBS | OXYGEN SATURATION: 96 %

## 2025-08-28 DIAGNOSIS — E66.812 CLASS 2 SEVERE OBESITY DUE TO EXCESS CALORIES WITH SERIOUS COMORBIDITY AND BODY MASS INDEX (BMI) OF 37.0 TO 37.9 IN ADULT: Primary | ICD-10-CM

## 2025-08-28 DIAGNOSIS — E66.01 CLASS 2 SEVERE OBESITY DUE TO EXCESS CALORIES WITH SERIOUS COMORBIDITY AND BODY MASS INDEX (BMI) OF 37.0 TO 37.9 IN ADULT: Primary | ICD-10-CM

## 2025-08-28 DIAGNOSIS — G47.33 OSA (OBSTRUCTIVE SLEEP APNEA): ICD-10-CM

## 2025-08-28 DIAGNOSIS — G47.36 HYPOXEMIA ASSOCIATED WITH SLEEP: ICD-10-CM

## 2025-08-28 PROCEDURE — 99213 OFFICE O/P EST LOW 20 MIN: CPT | Performed by: INTERNAL MEDICINE

## 2025-08-28 PROCEDURE — G0463 HOSPITAL OUTPT CLINIC VISIT: HCPCS

## (undated) DEVICE — INTRO STEER AGILIS NXT MED/CURL 8.5F

## (undated) DEVICE — SENSR O2 OXIMAX FNGR A/ 18IN NONSTR

## (undated) DEVICE — PINNACLE INTRODUCER SHEATH: Brand: PINNACLE

## (undated) DEVICE — SOUNDSTAR ECO 8F G CATHETER: Brand: SOUNDSTAR

## (undated) DEVICE — CATH ULTRASND ECHO ACUNAV FOR GE VIVID1 8F 90CM

## (undated) DEVICE — ADAPT CLN BIOGUARD AIR/H2O DISP

## (undated) DEVICE — CANN O2 ETCO2 FITS ALL CONN CO2 SMPL A/ 7IN DISP LF

## (undated) DEVICE — TUBING, SUCTION, 1/4" X 10', STRAIGHT: Brand: MEDLINE

## (undated) DEVICE — LN SMPL CO2 SHTRM SD STREAM W/M LUER

## (undated) DEVICE — Device

## (undated) DEVICE — PERCLOSE™ PROSTYLE™ SUTURE-MEDIATED CLOSURE AND REPAIR SYSTEM: Brand: PERCLOSE™ PROSTYLE™

## (undated) DEVICE — SHEATH STEER ABLAT PULSESELECT/PFA FLEXCATH/CONTOUR 10F 13MM

## (undated) DEVICE — CATH ABLAT PULSESELECT/PFA OTW 9/ELECTRD 145CM

## (undated) DEVICE — ACCESS SOLUTION: Brand: VERSACROSS™ ACCESS SOLUTION

## (undated) DEVICE — PENCL SMOKE/EVAC MEGADYNE TELESCP 10FT

## (undated) DEVICE — Device: Brand: REFERENCE PATCH CARTO 3

## (undated) DEVICE — KT ORCA ORCAPOD DISP STRL

## (undated) DEVICE — GW AMPLATZ  XSTIFF CVD .032 3MM 180CM

## (undated) DEVICE — 1 X VERSACROSS TRANSSEPTAL SHEATH (INCLUDING  1 X J-TIP GUIDEWIRE); 1 X VERSACROSS RF WIRE (INCLUDING 1 X CONNECTOR CABLE (SINGLE USE)); 1 X DISPERSIVE ELECTRODE: Brand: VERSACROSS ACCESS SOLUTION

## (undated) DEVICE — LOU EP: Brand: MEDLINE INDUSTRIES, INC.

## (undated) DEVICE — Device: Brand: WEBSTER CS

## (undated) DEVICE — OCTA,PERSEID,2-2-2-2-2,F-CURVE: Brand: OCTARAY MAPPING CATHETER